# Patient Record
Sex: MALE | Race: WHITE | Employment: OTHER | URBAN - METROPOLITAN AREA
[De-identification: names, ages, dates, MRNs, and addresses within clinical notes are randomized per-mention and may not be internally consistent; named-entity substitution may affect disease eponyms.]

---

## 2018-09-26 ENCOUNTER — HOSPITAL ENCOUNTER (INPATIENT)
Facility: CLINIC | Age: 73
LOS: 2 days | Discharge: HOME | DRG: 871 | End: 2018-09-28
Attending: EMERGENCY MEDICINE | Admitting: INTERNAL MEDICINE
Payer: MEDICARE

## 2018-09-26 ENCOUNTER — TRANSFERRED RECORDS (OUTPATIENT)
Dept: HEALTH INFORMATION MANAGEMENT | Facility: CLINIC | Age: 73
End: 2018-09-26

## 2018-09-26 DIAGNOSIS — J18.9 COMMUNITY ACQUIRED PNEUMONIA OF LEFT LOWER LOBE OF LUNG: ICD-10-CM

## 2018-09-26 DIAGNOSIS — R65.20 SEVERE SEPSIS (H): ICD-10-CM

## 2018-09-26 DIAGNOSIS — A41.9 SEVERE SEPSIS (H): ICD-10-CM

## 2018-09-26 DIAGNOSIS — J01.00 ACUTE NON-RECURRENT MAXILLARY SINUSITIS: ICD-10-CM

## 2018-09-26 DIAGNOSIS — E11.9 DIABETES MELLITUS WITHOUT COMPLICATION (H): Primary | ICD-10-CM

## 2018-09-26 LAB
CO2 BLDCOV-SCNC: 26 MMOL/L (ref 21–28)
CO2 BLDCOV-SCNC: 27 MMOL/L (ref 21–28)
CREAT SERPL-MCNC: 1.1 MG/DL (ref 0.66–1.25)
CREAT SERPL-MCNC: 1.1 MG/DL (ref 0.73–1.18)
GFR SERPL CREATININE-BSD FRML MDRD: 66 ML/MIN/1.7M2
GFR SERPL CREATININE-BSD FRML MDRD: >60 ML/MIN/1.73M2
GLUCOSE BLDC GLUCOMTR-MCNC: 187 MG/DL (ref 70–99)
GLUCOSE BLDC GLUCOMTR-MCNC: 194 MG/DL (ref 70–99)
GLUCOSE SERPL-MCNC: 316 MG/DL (ref 70–100)
HBA1C MFR BLD: 7.5 % (ref 0–5.6)
INTERPRETATION ECG - MUSE: NORMAL
LACTATE BLD-SCNC: 1.7 MMOL/L (ref 0.7–2)
LACTATE BLD-SCNC: 3.1 MMOL/L (ref 0.7–2.1)
LACTATE BLD-SCNC: 3.5 MMOL/L (ref 0.7–2.1)
PCO2 BLDV: 39 MM HG (ref 40–50)
PCO2 BLDV: 45 MM HG (ref 40–50)
PH BLDV: 7.38 PH (ref 7.32–7.43)
PH BLDV: 7.43 PH (ref 7.32–7.43)
PLATELET # BLD AUTO: 206 10E9/L (ref 150–450)
PO2 BLDV: 30 MM HG (ref 25–47)
PO2 BLDV: 36 MM HG (ref 25–47)
POTASSIUM SERPL-SCNC: 4.2 MMOL/L (ref 3.4–5.3)
POTASSIUM SERPL-SCNC: 4.8 MMOL/L (ref 3.5–5.2)
SAO2 % BLDV FROM PO2: 56 %
SAO2 % BLDV FROM PO2: 70 %
TROPONIN I SERPL-MCNC: <0.015 UG/L (ref 0–0.04)

## 2018-09-26 PROCEDURE — 83036 HEMOGLOBIN GLYCOSYLATED A1C: CPT | Performed by: INTERNAL MEDICINE

## 2018-09-26 PROCEDURE — 25000125 ZZHC RX 250: Performed by: INTERNAL MEDICINE

## 2018-09-26 PROCEDURE — A9270 NON-COVERED ITEM OR SERVICE: HCPCS | Performed by: EMERGENCY MEDICINE

## 2018-09-26 PROCEDURE — 99285 EMERGENCY DEPT VISIT HI MDM: CPT | Mod: 25

## 2018-09-26 PROCEDURE — 93010 ELECTROCARDIOGRAM REPORT: CPT | Performed by: INTERNAL MEDICINE

## 2018-09-26 PROCEDURE — 36415 COLL VENOUS BLD VENIPUNCTURE: CPT | Performed by: INTERNAL MEDICINE

## 2018-09-26 PROCEDURE — 25000128 H RX IP 250 OP 636: Performed by: INTERNAL MEDICINE

## 2018-09-26 PROCEDURE — 84132 ASSAY OF SERUM POTASSIUM: CPT | Performed by: INTERNAL MEDICINE

## 2018-09-26 PROCEDURE — A9270 NON-COVERED ITEM OR SERVICE: HCPCS | Mod: GY | Performed by: INTERNAL MEDICINE

## 2018-09-26 PROCEDURE — 87040 BLOOD CULTURE FOR BACTERIA: CPT | Performed by: EMERGENCY MEDICINE

## 2018-09-26 PROCEDURE — 83605 ASSAY OF LACTIC ACID: CPT | Performed by: INTERNAL MEDICINE

## 2018-09-26 PROCEDURE — 96365 THER/PROPH/DIAG IV INF INIT: CPT

## 2018-09-26 PROCEDURE — 40000275 ZZH STATISTIC RCP TIME EA 10 MIN

## 2018-09-26 PROCEDURE — 00000146 ZZHCL STATISTIC GLUCOSE BY METER IP

## 2018-09-26 PROCEDURE — A9270 NON-COVERED ITEM OR SERVICE: HCPCS | Performed by: INTERNAL MEDICINE

## 2018-09-26 PROCEDURE — 94640 AIRWAY INHALATION TREATMENT: CPT

## 2018-09-26 PROCEDURE — 84484 ASSAY OF TROPONIN QUANT: CPT | Performed by: EMERGENCY MEDICINE

## 2018-09-26 PROCEDURE — 85049 AUTOMATED PLATELET COUNT: CPT | Performed by: INTERNAL MEDICINE

## 2018-09-26 PROCEDURE — 25000125 ZZHC RX 250: Performed by: EMERGENCY MEDICINE

## 2018-09-26 PROCEDURE — 12000000 ZZH R&B MED SURG/OB

## 2018-09-26 PROCEDURE — 93005 ELECTROCARDIOGRAM TRACING: CPT

## 2018-09-26 PROCEDURE — A9270 NON-COVERED ITEM OR SERVICE: HCPCS | Mod: GY | Performed by: EMERGENCY MEDICINE

## 2018-09-26 PROCEDURE — 82565 ASSAY OF CREATININE: CPT | Performed by: INTERNAL MEDICINE

## 2018-09-26 PROCEDURE — 96361 HYDRATE IV INFUSION ADD-ON: CPT

## 2018-09-26 PROCEDURE — 83605 ASSAY OF LACTIC ACID: CPT

## 2018-09-26 PROCEDURE — 99222 1ST HOSP IP/OBS MODERATE 55: CPT | Mod: AI | Performed by: INTERNAL MEDICINE

## 2018-09-26 PROCEDURE — 25000132 ZZH RX MED GY IP 250 OP 250 PS 637: Mod: GY | Performed by: EMERGENCY MEDICINE

## 2018-09-26 PROCEDURE — 25000131 ZZH RX MED GY IP 250 OP 636 PS 637: Mod: GY | Performed by: INTERNAL MEDICINE

## 2018-09-26 PROCEDURE — 25000128 H RX IP 250 OP 636: Performed by: EMERGENCY MEDICINE

## 2018-09-26 PROCEDURE — 25000132 ZZH RX MED GY IP 250 OP 250 PS 637: Mod: GY | Performed by: INTERNAL MEDICINE

## 2018-09-26 PROCEDURE — 82803 BLOOD GASES ANY COMBINATION: CPT

## 2018-09-26 RX ORDER — ASPIRIN 81 MG/1
81 TABLET ORAL DAILY
Status: DISCONTINUED | OUTPATIENT
Start: 2018-09-26 | End: 2018-09-28 | Stop reason: HOSPADM

## 2018-09-26 RX ORDER — POTASSIUM CL/LIDO/0.9 % NACL 10MEQ/0.1L
10 INTRAVENOUS SOLUTION, PIGGYBACK (ML) INTRAVENOUS
Status: DISCONTINUED | OUTPATIENT
Start: 2018-09-26 | End: 2018-09-28 | Stop reason: HOSPADM

## 2018-09-26 RX ORDER — POTASSIUM CHLORIDE 1.5 G/1.58G
20-40 POWDER, FOR SOLUTION ORAL
Status: DISCONTINUED | OUTPATIENT
Start: 2018-09-26 | End: 2018-09-28 | Stop reason: HOSPADM

## 2018-09-26 RX ORDER — NICOTINE POLACRILEX 4 MG/1
20 GUM, CHEWING ORAL AT BEDTIME
Status: ON HOLD | COMMUNITY
End: 2021-04-29

## 2018-09-26 RX ORDER — CEFTRIAXONE 1 G/1
1 INJECTION, POWDER, FOR SOLUTION INTRAMUSCULAR; INTRAVENOUS ONCE
Status: COMPLETED | OUTPATIENT
Start: 2018-09-26 | End: 2018-09-26

## 2018-09-26 RX ORDER — CEFTRIAXONE 1 G/1
1 INJECTION, POWDER, FOR SOLUTION INTRAMUSCULAR; INTRAVENOUS EVERY 24 HOURS
Status: DISCONTINUED | OUTPATIENT
Start: 2018-09-27 | End: 2018-09-28 | Stop reason: HOSPADM

## 2018-09-26 RX ORDER — LEVOTHYROXINE SODIUM 150 UG/1
150 TABLET ORAL DAILY
Status: DISCONTINUED | OUTPATIENT
Start: 2018-09-26 | End: 2018-09-28 | Stop reason: HOSPADM

## 2018-09-26 RX ORDER — POTASSIUM CHLORIDE 1500 MG/1
20-40 TABLET, EXTENDED RELEASE ORAL
Status: DISCONTINUED | OUTPATIENT
Start: 2018-09-26 | End: 2018-09-28 | Stop reason: HOSPADM

## 2018-09-26 RX ORDER — OXYMETAZOLINE HYDROCHLORIDE 0.05 G/100ML
2 SPRAY NASAL 2 TIMES DAILY
Status: COMPLETED | OUTPATIENT
Start: 2018-09-26 | End: 2018-09-28

## 2018-09-26 RX ORDER — LISINOPRIL 10 MG/1
10 TABLET ORAL AT BEDTIME
Status: ON HOLD | COMMUNITY
End: 2021-05-02

## 2018-09-26 RX ORDER — LEVOTHYROXINE SODIUM 150 UG/1
150 TABLET ORAL DAILY
COMMUNITY

## 2018-09-26 RX ORDER — AZITHROMYCIN 250 MG/1
250 TABLET, FILM COATED ORAL DAILY
Status: DISCONTINUED | OUTPATIENT
Start: 2018-09-27 | End: 2018-09-27

## 2018-09-26 RX ORDER — POTASSIUM CHLORIDE 7.45 MG/ML
10 INJECTION INTRAVENOUS
Status: DISCONTINUED | OUTPATIENT
Start: 2018-09-26 | End: 2018-09-28 | Stop reason: HOSPADM

## 2018-09-26 RX ORDER — GLIPIZIDE 2.5 MG/1
2.5 TABLET, EXTENDED RELEASE ORAL DAILY
Status: ON HOLD | COMMUNITY
End: 2018-09-28

## 2018-09-26 RX ORDER — NICOTINE POLACRILEX 4 MG
15-30 LOZENGE BUCCAL
Status: DISCONTINUED | OUTPATIENT
Start: 2018-09-26 | End: 2018-09-28 | Stop reason: HOSPADM

## 2018-09-26 RX ORDER — GLIPIZIDE 2.5 MG/1
2.5 TABLET, EXTENDED RELEASE ORAL DAILY
Status: DISCONTINUED | OUTPATIENT
Start: 2018-09-27 | End: 2018-09-28 | Stop reason: HOSPADM

## 2018-09-26 RX ORDER — ONDANSETRON 4 MG/1
4 TABLET, ORALLY DISINTEGRATING ORAL EVERY 6 HOURS PRN
Status: DISCONTINUED | OUTPATIENT
Start: 2018-09-26 | End: 2018-09-28 | Stop reason: HOSPADM

## 2018-09-26 RX ORDER — DEXTROSE MONOHYDRATE 25 G/50ML
25-50 INJECTION, SOLUTION INTRAVENOUS
Status: DISCONTINUED | OUTPATIENT
Start: 2018-09-26 | End: 2018-09-28 | Stop reason: HOSPADM

## 2018-09-26 RX ORDER — ACETAMINOPHEN 325 MG/1
650 TABLET ORAL EVERY 4 HOURS PRN
Status: DISCONTINUED | OUTPATIENT
Start: 2018-09-26 | End: 2018-09-28 | Stop reason: HOSPADM

## 2018-09-26 RX ORDER — AZITHROMYCIN 250 MG/1
500 TABLET, FILM COATED ORAL ONCE
Status: COMPLETED | OUTPATIENT
Start: 2018-09-26 | End: 2018-09-26

## 2018-09-26 RX ORDER — POTASSIUM CHLORIDE 29.8 MG/ML
20 INJECTION INTRAVENOUS
Status: DISCONTINUED | OUTPATIENT
Start: 2018-09-26 | End: 2018-09-28 | Stop reason: HOSPADM

## 2018-09-26 RX ORDER — LISINOPRIL 10 MG/1
10 TABLET ORAL AT BEDTIME
Status: DISCONTINUED | OUTPATIENT
Start: 2018-09-26 | End: 2018-09-28 | Stop reason: HOSPADM

## 2018-09-26 RX ORDER — ONDANSETRON 2 MG/ML
4 INJECTION INTRAMUSCULAR; INTRAVENOUS EVERY 6 HOURS PRN
Status: DISCONTINUED | OUTPATIENT
Start: 2018-09-26 | End: 2018-09-28 | Stop reason: HOSPADM

## 2018-09-26 RX ORDER — OXYMETAZOLINE HYDROCHLORIDE 0.05 G/100ML
2 SPRAY NASAL ONCE
Status: COMPLETED | OUTPATIENT
Start: 2018-09-26 | End: 2018-09-26

## 2018-09-26 RX ORDER — ALBUTEROL SULFATE 0.83 MG/ML
2.5 SOLUTION RESPIRATORY (INHALATION) ONCE
Status: COMPLETED | OUTPATIENT
Start: 2018-09-26 | End: 2018-09-26

## 2018-09-26 RX ORDER — NALOXONE HYDROCHLORIDE 0.4 MG/ML
.1-.4 INJECTION, SOLUTION INTRAMUSCULAR; INTRAVENOUS; SUBCUTANEOUS
Status: DISCONTINUED | OUTPATIENT
Start: 2018-09-26 | End: 2018-09-28 | Stop reason: HOSPADM

## 2018-09-26 RX ADMIN — ENOXAPARIN SODIUM 40 MG: 40 INJECTION SUBCUTANEOUS at 16:19

## 2018-09-26 RX ADMIN — LEVOTHYROXINE SODIUM 150 MCG: 150 TABLET ORAL at 16:19

## 2018-09-26 RX ADMIN — OXYMETAZOLINE HYDROCHLORIDE 2 SPRAY: 5 SPRAY NASAL at 12:33

## 2018-09-26 RX ADMIN — SODIUM CHLORIDE 250 ML: 9 INJECTION, SOLUTION INTRAVENOUS at 13:45

## 2018-09-26 RX ADMIN — CEFTRIAXONE SODIUM 1 G: 1 INJECTION, POWDER, FOR SOLUTION INTRAMUSCULAR; INTRAVENOUS at 12:32

## 2018-09-26 RX ADMIN — OXYMETAZOLINE HYDROCHLORIDE 2 SPRAY: 0.05 SPRAY NASAL at 22:56

## 2018-09-26 RX ADMIN — SODIUM CHLORIDE, POTASSIUM CHLORIDE, SODIUM LACTATE AND CALCIUM CHLORIDE 1000 ML: 600; 310; 30; 20 INJECTION, SOLUTION INTRAVENOUS at 12:16

## 2018-09-26 RX ADMIN — SODIUM CHLORIDE 500 ML: 9 INJECTION, SOLUTION INTRAVENOUS at 12:48

## 2018-09-26 RX ADMIN — AZITHROMYCIN MONOHYDRATE 500 MG: 250 TABLET ORAL at 12:31

## 2018-09-26 RX ADMIN — INSULIN ASPART 1 UNITS: 100 INJECTION, SOLUTION INTRAVENOUS; SUBCUTANEOUS at 18:18

## 2018-09-26 RX ADMIN — SODIUM CHLORIDE 1000 ML: 9 INJECTION, SOLUTION INTRAVENOUS at 16:07

## 2018-09-26 RX ADMIN — OMEPRAZOLE 20 MG: 20 CAPSULE, DELAYED RELEASE ORAL at 22:56

## 2018-09-26 RX ADMIN — ALBUTEROL SULFATE 2.5 MG: 2.5 SOLUTION RESPIRATORY (INHALATION) at 12:17

## 2018-09-26 RX ADMIN — ASPIRIN 81 MG: 81 TABLET, COATED ORAL at 16:19

## 2018-09-26 RX ADMIN — LISINOPRIL 10 MG: 10 TABLET ORAL at 22:56

## 2018-09-26 ASSESSMENT — ENCOUNTER SYMPTOMS
SHORTNESS OF BREATH: 0
NAUSEA: 0
COUGH: 1
HEADACHES: 0
FEVER: 1
CHEST TIGHTNESS: 1
DIARRHEA: 0
SINUS PAIN: 1
VOMITING: 0
NUMBNESS: 0

## 2018-09-26 ASSESSMENT — ACTIVITIES OF DAILY LIVING (ADL)
ADLS_ACUITY_SCORE: 11
ADLS_ACUITY_SCORE: 9

## 2018-09-26 NOTE — PHARMACY-ADMISSION MEDICATION HISTORY
Admission medication history interview status for this patient is complete. See [facility] admission navigator for allergy information, prior to admission medications and immunization status.     Medication history interview source(s):Patient  Medication history resources (including written lists, pill bottles, clinic record):Care Everywhere  Primary pharmacy:[facility]    Changes made to PTA medication list:  Added: all medications  Deleted: none  Changed: none    Actions taken by pharmacist (provider contacted, etc):None     Additional medication history information:None    Medication reconciliation/reorder completed by provider prior to medication history? No    Do you take OTC medications (eg tylenol, ibuprofen, fish oil, eye/ear drops, etc)? Y(Y/N)    For patients on insulin therapy: N (Y/N)  Lantus/levemir/NPH/Mix 70/30 dose:   (Y/N) (see Med list for doses)   Sliding scale Novolog Y/N  If Yes, do you have a baseline novolog pre-meal dose:  units with meals  Patients eat three meals a day:   Y/N    How many episodes of hypoglycemia do you have per week: _______  How many missed doses do you have per week: ______  How many times do you check your blood glucose per day: _______   Any Barriers to therapy - Be specific :  cost of medications, comfortable with giving injections (if applicable), comfortable and confident with current diabetes regimen: Y/N ______________      Prior to Admission medications    Medication Sig Last Dose Taking? Auth Provider   aspirin 325 MG EC tablet Take 650 mg by mouth daily as needed for moderate pain  Yes Unknown, Entered By History   glipiZIDE (GLUCOTROL XL) 2.5 MG 24 hr tablet Take 2.5 mg by mouth daily 9/26/2018 at Unknown time Yes Unknown, Entered By History   levothyroxine (SYNTHROID/LEVOTHROID) 150 MCG tablet Take 150 mcg by mouth daily 9/25/2018 at Unknown time Yes Unknown, Entered By History   lisinopril (PRINIVIL/ZESTRIL) 10 MG tablet Take 10 mg by mouth At Bedtime  9/25/2018 at Unknown time Yes Unknown, Entered By History   metFORMIN (GLUCOPHAGE) 500 MG tablet Take 500 mg by mouth daily (with breakfast) 9/26/2018 at Unknown time Yes Unknown, Entered By History   metFORMIN (GLUCOPHAGE) 500 MG tablet Take 1,000 mg by mouth daily (with dinner) 9/25/2018 at Unknown time Yes Unknown, Entered By History   omeprazole 20 MG tablet Take 20 mg by mouth At Bedtime 9/25/2018 at Unknown time Yes Unknown, Entered By History

## 2018-09-26 NOTE — IP AVS SNAPSHOT
MRN:[id number]                      After Visit Summary   9/26/2018    Luis Vidal    MRN: [id number]           Thank you!     Thank you for choosing [facility] for your care. Our goal is always to provide you with excellent care. Hearing back from our patients is one way we can continue to improve our services. Please take a few minutes to complete the written survey that you may receive in the mail after you visit. If you would like to speak to someone directly about your visit please contact Patient Relations at [phone]. Thank you!          Patient Information     Date Of Birth          1945        Designated Caregiver       Most Recent Value    Caregiver    Will someone help with your care after discharge? yes    Name of designated caregiver Lexie    Phone number of caregiver se chart    Caregiver address see chart       About your hospital stay     You were admitted on:  September 26, 2018 You last received care in the:  [place] [address] [facility]    You were discharged on:  September 28, 2018        Reason for your hospital stay       Community acquired pneumonia, sinusitis.                  Who to Call     For medical emergencies, please call 911.  For non-urgent questions about your medical care, please call your primary care provider or clinic, [phone]          Attending Provider     Provider Specialty    Anibal Ragland MD Emergency Medicine    [facility], Franco Pastor III, MD Internal Medicine       Primary Care Provider Office Phone # Fax #    Shailesh Lipscomb [phone]      After Care Instructions     Monitor and record       Blood glucose as previous.                  Follow-up Appointments     Follow-up and recommended labs and tests        Dr Turcios in 5 days.  CXR in 6 weeks.                  Pending Results     Date and Time Order Name Status Description    9/26/2018 1208 Blood culture ONE site Preliminary   "           Statement of Approval     Ordered          18 1034  I have reviewed and agree with all the recommendations and orders detailed in this document.  EFFECTIVE NOW     Approved and electronically signed by:  Luis A Coats MD             Admission Information     Date & Time Provider Department Dept. Phone    2018 Franco Valle III, MD [place] [address] [facility] [phone]      Your Vitals Were     Blood Pressure Pulse Temperature Respirations Weight Pulse Oximetry    151/82 (BP Location: Left arm) 79 97.2  F (36.2  C) (Oral) 16 80.7 kg (178 lb) 92%      MyChart Information     [employer] lets you send messages to your doctor, view your test results, renew your prescriptions, schedule appointments and more. To sign up, go to [email or web address] . Click on \"Log in\" on the left side of the screen, which will take you to the Welcome page. Then click on \"Sign up Now\" on the right side of the page.     You will be asked to enter the access code listed below, as well as some personal information. Please follow the directions to create your username and password.     Your access code is: [email or web address]  Expires: 2018  2:24 PM     Your access code will  in 90 days. If you need help or a new code, please call your [place] clinic or [phone].        Care EveryWhere ID     This is your Care EveryWhere ID. This could be used by other organizations to access your [place] medical records  [id number]        Equal Access to Services     [facility] Center[facility] : Hadii aad ku hadasho Soomaali, waaxda luqadaha, qaybta kaalmada adeegyada, waxay angel hayhernann ting meade . So [facility] [phone].    ATENCIÓN: Si habla español, tiene a dunn disposición servicios gratuitos de asistencia lingüística. Llame al [phone].    We comply with applicable federal civil rights laws and [place] laws. We do not discriminate on the basis of race, color, national origin, age, " disability, sex, sexual orientation, or gender identity.               Review of your medicines      START taking        Dose / Directions    amoxicillin-clavulanate 875-125 MG per tablet   Commonly known as:  AUGMENTIN   Used for:  Severe sepsis (H), Community acquired pneumonia of left lower lobe of lung (H)        Dose:  1 tablet   Take 1 tablet by mouth 2 times daily for 8 days   Quantity:  16 tablet   Refills:  0         CONTINUE these medicines which may have CHANGED, or have new prescriptions. If we are uncertain of the size of tablets/capsules you have at home, strength may be listed as something that might have changed.        Dose / Directions    glipiZIDE 5 MG 24 hr tablet   Commonly known as:  GLUCOTROL XL   This may have changed:    - medication strength  - how much to take   Used for:  Diabetes mellitus without complication (H)        Dose:  5 mg   Take 1 tablet (5 mg) by mouth daily   Quantity:  30 tablet   Refills:  0         CONTINUE these medicines which have NOT CHANGED        Dose / Directions    aspirin 325 MG EC tablet        Dose:  650 mg   Take 650 mg by mouth daily as needed for moderate pain   Refills:  0       levothyroxine 150 MCG tablet   Commonly known as:  SYNTHROID/LEVOTHROID        Dose:  150 mcg   Take 150 mcg by mouth daily   Refills:  0       lisinopril 10 MG tablet   Commonly known as:  PRINIVIL/ZESTRIL        Dose:  10 mg   Take 10 mg by mouth At Bedtime   Refills:  0       omeprazole 20 MG tablet        Dose:  20 mg   Take 20 mg by mouth At Bedtime   Refills:  0         STOP taking     metFORMIN 500 MG tablet   Commonly known as:  GLUCOPHAGE                Where to get your medicines      These medications were sent to [place] [address] [facility] [place] - [address]  [address] [address]     Phone:  [phone]     amoxicillin-clavulanate 875-125 MG per tablet    glipiZIDE 5 MG 24 hr tablet                Protect others around you: Learn how to safely  use, store and throw away your medicines at www.disposemymeds.org.        ANTIBIOTIC INSTRUCTION     You've Been Prescribed an Antibiotic - Now What?  Your healthcare team thinks that you or your loved one might have an infection. Some infections can be treated with antibiotics, which are powerful, life-saving drugs. Like all medications, antibiotics have side effects and should only be used when necessary. There are some important things you should know about your antibiotic treatment.      Your healthcare team may run tests before you start taking an antibiotic.    Your team may take samples (e.g., from your blood, urine or other areas) to run tests to look for bacteria. These test can be important to determine if you need an antibiotic at all and, if you do, which antibiotic will work best.      Within a few days, your healthcare team might change or even stop your antibiotic.    Your team may start you on an antibiotic while they are working to find out what is making you sick.    Your team might change your antibiotic because test results show that a different antibiotic would be better to treat your infection.    In some cases, once your team has more information, they learn that you do not need an antibiotic at all. They may find out that you don't have an infection, or that the antibiotic you're taking won't work against your infection. For example, an infection caused by a virus can't be treated with antibiotics. Staying on an antibiotic when you don't need it is more likely to be harmful than helpful.      You may experience side effects from your antibiotic.    Like all medications, antibiotics have side effects. Some of these can be serious.    Let you healthcare team know if you have any known allergies when you are admitted to the hospital.    One significant side effect of nearly all antibiotics is the risk of severe and sometimes deadly diarrhea caused by Clostridium difficile (C. Difficile). This  occurs when a person takes antibiotics because some good germs are destroyed. Antibiotic use allows C. diificile to take over, putting patients at high risk for this serious infection.    As a patient or caregiver, it is important to understand your or your loved one's antibiotic treatment. It is especially important for caregivers to speak up when patients can't speak for themselves. Here are some important questions to ask your healthcare team.    What infection is this antibiotic treating and how do you know I have that infection?    What side effects might occur from this antibiotic?    How long will I need to take this antibiotic?    Is it safe to take this antibiotic with other medications or supplements (e.g., vitamins) that I am taking?     Are there any special directions I need to know about taking this antibiotic? For example, should I take it with food?    How will I be monitored to know whether my infection is responding to the antibiotic?    What tests may help to make sure the right antibiotic is prescribed for me?      Information provided by:  www.cdc.gov/getsmart  U.S. Department of Health and Human Services  Centers for disease Control and Prevention  National Center for Emerging and Zoonotic Infectious Diseases  Division of Healthcare Quality Promotion             Medication List: This is a list of all your medications and when to take them. Check marks below indicate your daily home schedule. Keep this list as a reference.      Medications           Morning Afternoon Evening Bedtime As Needed    amoxicillin-clavulanate 875-125 MG per tablet   Commonly known as:  AUGMENTIN   Take 1 tablet by mouth 2 times daily for 8 days   Next Dose Due:  9/28:  evening                                      aspirin 325 MG EC tablet   Take 650 mg by mouth daily as needed for moderate pain   Last time this was given:  81 mg on 9/28/2018  8:03 AM   Next Dose Due:  9/29:AM                                   glipiZIDE  5 MG 24 hr tablet   Commonly known as:  GLUCOTROL XL   Take 1 tablet (5 mg) by mouth daily   Last time this was given:  2.5 mg on 9/28/2018  8:03 AM   Next Dose Due:  9/29:AM                                   levothyroxine 150 MCG tablet   Commonly known as:  SYNTHROID/LEVOTHROID   Take 150 mcg by mouth daily   Last time this was given:  150 mcg on 9/28/2018  8:03 AM   Next Dose Due:  9/29:AM                                   lisinopril 10 MG tablet   Commonly known as:  PRINIVIL/ZESTRIL   Take 10 mg by mouth At Bedtime   Last time this was given:  10 mg on 9/27/2018  9:40 PM   Next Dose Due:  9/28: bedtime                                   omeprazole 20 MG tablet   Take 20 mg by mouth At Bedtime   Next Dose Due:  9/28:bedtime

## 2018-09-26 NOTE — IP AVS SNAPSHOT
[place] [address] [facility]    [address]    [facility] [address]    Phone:  [phone]    Fax:  [phone]                                       After Visit Summary   9/26/2018    Luis Vidal    MRN: [id number]           After Visit Summary Signature Page     I have received my discharge instructions, and my questions have been answered. I have discussed any challenges I see with this plan with the nurse or doctor.    ..........................................................................................................................................  Patient/Patient Representative Signature      ..........................................................................................................................................  Patient Representative Print Name and Relationship to Patient    ..................................................               ................................................  Date                                   Time    ..........................................................................................................................................  Reviewed by Signature/Title    ...................................................              ..............................................  Date                                               Time          22EPIC Rev 08/18

## 2018-09-26 NOTE — ED TRIAGE NOTES
Cough and fevers for a few days.  Seen at clinic today.  Elevated WBC count and diagnosed with pneumonia.  Attempted to direct admit but unable.      ABCs intact.  Alert and oriented x 3.

## 2018-09-26 NOTE — H&P
[facility]    History and Physical  Hospitalist       Date of Admission:  9/26/2018    Assessment & Plan   Luis Vidal is a 72 year old male with a past medical history of hypertension, dyslipidemia, type 2 diabetes on orals, GERD with Puente's esophagus who presents from urgent care with pneumonia and sinusitis.    Bacterial pneumonia  -No hypoxia  -Leukocytosis of 22 and elevated lactate qualify for sepsis  -Additional 1 L bolus of IV fluids now  -Blood culture sent from the ED  -To new ceftriaxone and azithromycin  -Repeat an EKG in the morning to ensure QTC is not further prolonged, if so he likely will need to have his azithromycin replaced with doxycycline    Maxillary sinusitis  -This should be covered with antibiotics for above    Essential hypertension  -Continue home lisinopril    Type 2 diabetes  -Hold metformin given elevated lactate  -Continue oral sulfonylurea  -Correctional insulin, low  -Carb controlled diet    # Pain Assessment:  Current Pain Score 9/26/2018   Pain score (0-10) 0   Luis snider pain level was assessed and he currently denies pain.       DVT Prophylaxis: Enoxaparin (Lovenox) SQ  Code Status: Full Code    Disposition: Expected discharge in 2-3 days    Franco Valle III, MD    Primary Care Physician   Shailesh Lipscomb    Chief Complaint     History is obtained from the patient, ED physician and review of records.    History of Present Illness   Luis Vidal is a 72 year old male with a past medical history of hypertension, dyslipidemia, type 2 diabetes on orals, GERD with Puente's esophagus who presents from urgent care with apparent pneumonia and sinusitis.    Patient reports about 2 weeks of what he thought was a viral upper respiratory infection.  Some stuffiness and a cough.  He felt that he was improving until today when he began to have worsening shortness of breath and severe sinus pain particularly on the right.    He had a fever of 102 this  morning and presented to urgent care today where he was found to have a leukocytosis of 22k, a chest x-ray with a left-sided infiltrate, a sinus x-ray which showed some maxillary sinusitis and so he was directed to the emergency department for evaluation.  Next    In the ED he is afebrile and normotensive but he did have a lactate elevation of about 3.5 which did not improve with a 500 cc bolus.  He was started on ceftriaxone and azithromycin and admitted to medicine.    Of note his QTC is a little bit long at about 440.    Past Medical History    HTN, Type 2 DM, HLD, GERD, Puente's Esophagus    Past Surgical History   Denies    Prior to Admission Medications   Prior to Admission Medications   Prescriptions Last Dose Informant Patient Reported? Taking?   aspirin 325 MG EC tablet   Yes Yes   Sig: Take 650 mg by mouth daily as needed for moderate pain   glipiZIDE (GLUCOTROL XL) 2.5 MG 24 hr tablet 9/26/2018 at Unknown time  Yes Yes   Sig: Take 2.5 mg by mouth daily   levothyroxine (SYNTHROID/LEVOTHROID) 150 MCG tablet 9/25/2018 at Unknown time  Yes Yes   Sig: Take 150 mcg by mouth daily   lisinopril (PRINIVIL/ZESTRIL) 10 MG tablet 9/25/2018 at Unknown time  Yes Yes   Sig: Take 10 mg by mouth At Bedtime   metFORMIN (GLUCOPHAGE) 500 MG tablet 9/26/2018 at Unknown time  Yes Yes   Sig: Take 500 mg by mouth daily (with breakfast)   metFORMIN (GLUCOPHAGE) 500 MG tablet 9/25/2018 at Unknown time  Yes Yes   Sig: Take 1,000 mg by mouth daily (with dinner)   omeprazole 20 MG tablet 9/25/2018 at Unknown time  Yes Yes   Sig: Take 20 mg by mouth At Bedtime      Facility-Administered Medications: None     Allergies   No Known Allergies    Social History   Non-smoker, lives at home with wife      Family History   Family history reviewed with patient and is noncontributory.    Review of Systems   The 10 point Review of Systems is negative other than noted in the HPI or here.     Physical Exam   Temp: 98.6  F (37  C) Temp src:  Temporal BP: 125/78 Pulse: 96 Heart Rate: 98 Resp: 22 SpO2: 96 % O2 Device: None (Room air)    Vital Signs with Ranges  Temp:  [98.6  F (37  C)] 98.6  F (37  C)  Pulse:  [96] 96  Heart Rate:  [90-98] 98  Resp:  [8-22] 22  BP: (108-135)/(70-79) 125/78  SpO2:  [96 %-98 %] 96 %  178 lbs 0 oz    Constitutional: NAD. Comfortable, well-developed   Eyes: Anicteric, no conjunctival injection  ENT: Atraumatic, membranes moist   Neck: Supple, trachea midline  Respiratory: No wheeze or crackles. Breathing comfortably on room air  Cardiovascular: S1S2, no edema  GI: Abdomen soft, non-tender  Skin: Warm, pink, dry  Neurologic: Alert and oriented. CN II - XII grossly intact  Psychiatric: Pleasant, cooperative    Data   Data reviewed today:  I personally reviewed no images or EKG's today.    Recent Labs  Lab 09/26/18  1216   TROPI <0.015       Imaging:  No results found for this or any previous visit (from the past 24 hour(s)).

## 2018-09-26 NOTE — ED NOTES
Patient resting quietly in bed.  Given boxed lunch.  Meal tray ordered.  Family at bedside.  Continuing to monitor.

## 2018-09-26 NOTE — ED NOTES
[facility]  ED Nurse Handoff Report    Luis Vidla is a 72 year old male   ED Chief complaint: Cough; Fever; and Shortness of Breath  . ED Diagnosis:   Final diagnoses:   Community acquired pneumonia of left lower lobe of lung (H)   Severe sepsis (H)   Acute non-recurrent maxillary sinusitis     Allergies: No Known Allergies    Code Status: Full Code  Activity level - Baseline/Home:  Independent. Activity Level - Current:   Independent. Lift room needed: No. Bariatric: No   Needed: No   Isolation: Yes. Infection: Not Applicable.     Vital Signs:   Vitals:    09/26/18 1330 09/26/18 1345 09/26/18 1400 09/26/18 1415   BP: 113/75 116/70 135/79 125/78   Pulse:       Resp:    22   Temp:       TempSrc:       SpO2:       Weight:           Cardiac Rhythm:  ,      Pain level: 0-10 Pain Scale: 0  Patient confused: No. Patient Falls Risk: No.   Elimination Status: Has voided   Patient Report - Initial Complaint: Sent from clinic for admission and treatment of pneumonia. Focused Assessment: crackles to LL lobe, fevers at home and at clinic  Tests Performed: labs. Abnormal Results:   Labs Ordered and Resulted from Time of ED Arrival Up to the Time of Departure from the ED   ISTAT  GASES LACTATE RANDALL POCT - Abnormal; Notable for the following:        Result Value    PCO2 Venous 39 (*)     Lactic Acid 3.1 (*)     All other components within normal limits   ISTAT  GASES LACTATE RANDALL POCT - Abnormal; Notable for the following:     Lactic Acid 3.5 (*)     All other components within normal limits   TROPONIN I   ISTAT CG4 GASES LACTATE RANDALL NURSING POCT   BLOOD CULTURE   .   Treatments provided: antibiotics, fluids  Family Comments: wife at bedside, patient ambulatory without assist.  OBS brochure/video discussed/provided to patient:  N/A  ED Medications:   Medications   0.9% sodium chloride BOLUS (not administered)   0.9% sodium chloride BOLUS (0 mLs Intravenous Stopped 9/26/18 1322)   albuterol neb  solution 2.5 mg (2.5 mg Nebulization Given 9/26/18 1217)   oxymetazoline (AFRIN) 0.05 % spray 2 spray (2 sprays Nasal Given 9/26/18 1233)   cefTRIAXone (ROCEPHIN) 1 g vial to attach to  mL bag for ADULTS or NS 50 mL bag for PEDS (0 g Intravenous Stopped 9/26/18 1322)   lactated ringers BOLUS 1,000 mL (0 mLs Intravenous Stopped 9/26/18 1246)   azithromycin (ZITHROMAX) tablet 500 mg (500 mg Oral Given 9/26/18 1231)     Drips infusing:  No  For the majority of the shift, the patient's behavior Green. Interventions performed were none.     Severe Sepsis OR Septic Shock Diagnosis Present: No      ED Nurse Name/Phone Number: Letitia BANG Jazmine,   2:34 PM    RECEIVING UNIT ED HANDOFF REVIEW    Above ED Nurse Handoff Report was reviewed: Yes  Reviewed by: Amador Goodman on September 26, 2018 at 2:54 PM

## 2018-09-26 NOTE — ED PROVIDER NOTES
History     Chief Complaint:  Cough & Fever      HPI   Luis Vidal is a 72 year old male who presents to the ED via EMS for evaluation of a cough and fever. The patient comes here from urgent care, where he obtained a chest x-ray today showing signs of left-sided pneumonia, sinus x-ray showing signs of maxillary sinusitis, and labs showing an elevated WBC of 22.8. There the patient also experienced some hemoptysis, and they tried to admit him to the hospital without success. He was then given IM antibiotics and sent to the ED for evaluation. Here, the patient further reports that he has had a dry cough for the past several weeks. He also developed a fever last night into this morning, which he measured to be 102. This was associated with 2 episodes of left-sided chest pain and right-sided maxillary sinus throbbing. He denies any nausea, vomiting, diarrhea, visual disturbance, paresthesias, numbness, headache, ear pain, or shortness of breath. He denies any recent travel or current antibiotic use, and he does not use a nebulizer at home.    Imaging and blood tests obtained at [facility] on 9/26:  XR Chest, 2 views:  Linear opacities at the left lung base are favored due to mild subsegmental atelectasis or fibrosis. Lung consolidation/infiltrate felt less likely.    XR Sinuses, 1-2 views:  Findings are suspicious for acute right maxillary sinusitis, as per radiology.    CBC: WBC: 22.8 (H), HGB: 13.8, PLT: 266   BMP: Glucose 316 (H), CO2 21 (L),  (L), o/w WNL (Creatinine: 1.10)    Allergies:  Meperidine    Medications:    Insulin  Lisinopril  Aspirin  Synthroid  Prilosec  Metformin  Glucotrol  Zestril    Past Medical History:    Hypothyroidism  Obesity  Hypertension  Elevated PSA  Puente's esophagus with dysplasia  Hyperlipidemia  Diabetes, type II    Past Surgical History:    Prostate surgery    Family History:    Macular degeneration  Cataracts  Diabetes    Social History:  Negative  for tobacco use.  Alcohol use: yes  Marital Status:   [2]     Review of Systems   Constitutional: Positive for fever.   HENT: Positive for sinus pain. Negative for ear pain.    Eyes: Negative for visual disturbance.   Respiratory: Positive for cough and chest tightness. Negative for shortness of breath.    Cardiovascular: Positive for chest pain.   Gastrointestinal: Negative for diarrhea, nausea and vomiting.   Neurological: Negative for numbness and headaches.   All other systems reviewed and are negative.      Physical Exam     Patient Vitals for the past 24 hrs:   BP Temp Temp src Pulse Heart Rate Resp SpO2 Weight   09/26/18 1315 113/70 - - - 95 8 96 % -   09/26/18 1300 115/75 - - - 93 16 97 % -   09/26/18 1245 117/72 - - - 96 8 - -   09/26/18 1230 108/70 - - - 92 17 - -   09/26/18 1217 - - - - - - 98 % -   09/26/18 1215 112/72 - - - 90 21 - -   09/26/18 1200 117/74 - - - 92 22 - -   09/26/18 1157 134/76 98.6  F (37  C) Temporal 96 - 16 96 % 80.7 kg (178 lb)        Physical Exam   HENT:   Right Ear: External ear normal.   Left Ear: External ear normal.   Nose: Nose normal.   No oropharyngeal hypertrophy or exudates.  There is tenderness palpation over the right maxillary sinus more so than left maxillary sinus.  No pain over the frontal sinuses   Eyes: Conjunctivae and lids are normal.   Neck: Neck supple. No tracheal deviation present.   Cardiovascular: Regular rhythm and intact distal pulses.    Pulmonary/Chest: He has no rales.   Mild tachypnea and slightly prolonged expiratory phase   Abdominal: Soft. He exhibits no distension. There is no tenderness. There is no rebound and no guarding.   Musculoskeletal:   No peripheral edema   Neurological: He is alert.   MAEE, no gross focal motor or sensory deficit   Skin: Skin is warm and dry. He is not diaphoretic.   Psychiatric: He has a normal mood and affect.   Nursing note and vitals reviewed.      Emergency Department Course   ECG:  Indication: Chest  pain  Time: 1159  Vent. Rate 91 bpm. HI interval 204. QRS duration 126. QT/QTc 370/455. P-R-T axis 20 -47 0.  Normal sinus rhythm. Right bundle branch block.Left anterior fascicular block. Bifascicular block. Minimal voltage criteria for LVH, may be normal variant. Abnormal ECG. No significant change compared to EKG dated 12/2/15. Read time: 1200     Laboratory:  ISTAT gases lactate edison POCT: PCO2 39 (L), Lactic acid 3.1 (H), o/w WNL  Repeat ISTAT gases lactate edison POCT: Lactic acid 3.5 (H), o/w WNL    1216 Troponin: <0.015     Blood culture: pending    Interventions:  Medications   0.9% sodium chloride BOLUS (0 mLs Intravenous Stopped 9/26/18 1322)   albuterol neb solution 2.5 mg (2.5 mg Nebulization Given 9/26/18 1217)   oxymetazoline (AFRIN) 0.05 % spray 2 spray (2 sprays Nasal Given 9/26/18 1233)   cefTRIAXone (ROCEPHIN) 1 g vial to attach to  mL bag for ADULTS or NS 50 mL bag for PEDS (0 g Intravenous Stopped 9/26/18 1322)   lactated ringers BOLUS 1,000 mL (0 mLs Intravenous Stopped 9/26/18 1246)   azithromycin (ZITHROMAX) tablet 500 mg (500 mg Oral Given 9/26/18 1231)      1216 Lactated ringers 1L IV  1217 Albuterol 2.5 mg Neb  1231 Zithromax 500 mg PO  1232 Rocephin 1 g IV  1233 Afrin 2 sprays Nasal  1248  mL IV  Please see MAR for full list of medications administered in the ED.     Emergency Department Course:  Nursing notes and vitals reviewed. (1203) I performed an exam of the patient as documented above.     EKG obtained in the ED, see results above.      IV inserted. Medicine administered as documented above. Blood drawn. This was sent to the lab for further testing, results above.    (1313) I rechecked the patient and discussed the results of his workup thus far.     (1350)  I consulted with Dr. Valle of the hospitalist services. They are in agreement to accept the patient for admission.    Findings and plan explained to the Patient who consents to admission. Discussed the patient with  "Dr. Valle, who will admit the patient to a medicine bed for further monitoring, evaluation, and treatment.    Impression & Plan    CMS Diagnoses:  The patient has signs of Severe Sepsis as evidenced by:    1. 2 SIRS criteria, AND  2. Suspected infection, AND   3. Organ dysfunction: Lactic Acid > 1.9    Time severe sepsis diagnosis confirmed = 1214 as this was the time when Lactate resulted, and the level was > 1.9      3 Hour Severe Sepsis Bundle Completion:  1. Initial Lactic Acid Result:   Recent Labs   Lab Test  18   1322  18   1211   LACT  3.5*  3.1*     2. Blood Cultures before Antibiotics: Yes  3. Broad Spectrum Antibiotics Administered: Yes     Ceftriaxone at [facility], 1gm, and 1gm here in ED.  PO azithro as well    4. 2000 ml of IV fluids.  Patient height not recorded    Severe Sepsis reassessment:  1. Repeat Lactic Acid Level: 3.5  2. MAP>65 after initial IVF bolus, will continue to monitor fluid status and vital signs              Medical Decision Makin-year-old gentleman with 2 weeks of cough now, fever, and tenderness of the right maxillary sinus from urgent care, where his workup included a chest x-ray showing possible left lower lobe changes consistent with pneumonia versus atelectasis, as well as a sinus x-ray showing maxillary sinusitis.  White count was 22,000 and his blood sugar was over 300.  He was given 1 g of intramuscular Rocephin and sent over here for evaluation.  He has also had a couple episodes of chest pain, which he describes more as a tightness, along with his cough, and had an EKG at the outside hospital for which that provider was concerned with new T wave inversion.  Here his only complaint is his chest feels a little tight like \"asthma\".  His EKG shows a right bundle branch block and I do not appreciate any acute ischemia.  We will do a trial of a neb, along with blood gas/lactate with the ISTAT.  We will give him another gram of Rocephin IV for 2 g total, which " would be an appropriate weight-based dose for his presumed sinusitis/pneumonia as well as azithromycin to cover community acquired pathogens.  I doubt concomitant ACS PE etc.    The patient received some crystalloid fluids here in the emergency department, 1.5 L, and a repeat lactic acid was 3.5. His hemodynamics remained stable without hypotension, and no tachycardia or respiratory distress.  His subjective respiratory symptoms got better with bronchodilators.  Given the persistent lactate elevation, he meets criteria for severe sepsis and we will admit him here for further evaluation and management.  He received Rocephin and azithromycin here in the emergency department.      Critical Care time:  none    Diagnosis:    ICD-10-CM    1. Community acquired pneumonia of left lower lobe of lung (H) J18.1    2. Severe sepsis (H) A41.9     R65.20    3. Acute non-recurrent maxillary sinusitis J01.00        Disposition:  Admitted to Dr. Valle    Scribe Disclosure:  I, Christine Jacinto, am serving as a scribe on 9/26/2018 at 12:03 PM to personally document services performed by Anibal Ragland MD based on my observations and the provider's statements to me.      Christine Jacinto  9/26/2018   [facility] EMERGENCY DEPARTMENT       Anibal Ragland MD  09/26/18 2293

## 2018-09-26 NOTE — PLAN OF CARE
Problem: Patient Care Overview  Goal: Plan of Care/Patient Progress Review  Outcome: No Change  Arrived from ED at shift change (1520). A & Ox4, temp 99.2, , /64, RR 22, 93% sats on RA.  Fine crackles LLL. Denies pain.  States his head feels congested.  Received 1L IV NS Bolus as ordered for Lactic Acid 3.5.  Up independently in room.  , covered per sliding scale.  A1C 7.5.  Tolerating diet.  Sleeping between cares.  Continue to monitor, continue with plan of care.

## 2018-09-27 LAB
ANION GAP SERPL CALCULATED.3IONS-SCNC: 8 MMOL/L (ref 3–14)
BASOPHILS # BLD AUTO: 0.1 10E9/L (ref 0–0.2)
BASOPHILS NFR BLD AUTO: 0.7 %
BUN SERPL-MCNC: 11 MG/DL (ref 7–30)
CALCIUM SERPL-MCNC: 8.3 MG/DL (ref 8.5–10.1)
CHLORIDE SERPL-SCNC: 107 MMOL/L (ref 94–109)
CO2 SERPL-SCNC: 25 MMOL/L (ref 20–32)
CREAT SERPL-MCNC: 0.93 MG/DL (ref 0.66–1.25)
DIFFERENTIAL METHOD BLD: ABNORMAL
EOSINOPHIL # BLD AUTO: 0.2 10E9/L (ref 0–0.7)
EOSINOPHIL NFR BLD AUTO: 2.2 %
ERYTHROCYTE [DISTWIDTH] IN BLOOD BY AUTOMATED COUNT: 13.4 % (ref 10–15)
GFR SERPL CREATININE-BSD FRML MDRD: 80 ML/MIN/1.7M2
GLUCOSE BLDC GLUCOMTR-MCNC: 153 MG/DL (ref 70–99)
GLUCOSE BLDC GLUCOMTR-MCNC: 154 MG/DL (ref 70–99)
GLUCOSE BLDC GLUCOMTR-MCNC: 160 MG/DL (ref 70–99)
GLUCOSE BLDC GLUCOMTR-MCNC: 171 MG/DL (ref 70–99)
GLUCOSE BLDC GLUCOMTR-MCNC: 276 MG/DL (ref 70–99)
GLUCOSE SERPL-MCNC: 170 MG/DL (ref 70–99)
HCT VFR BLD AUTO: 37.4 % (ref 40–53)
HGB BLD-MCNC: 12.4 G/DL (ref 13.3–17.7)
IMM GRANULOCYTES # BLD: 0.1 10E9/L (ref 0–0.4)
IMM GRANULOCYTES NFR BLD: 0.5 %
LYMPHOCYTES # BLD AUTO: 1.5 10E9/L (ref 0.8–5.3)
LYMPHOCYTES NFR BLD AUTO: 13.7 %
MCH RBC QN AUTO: 28.8 PG (ref 26.5–33)
MCHC RBC AUTO-ENTMCNC: 33.2 G/DL (ref 31.5–36.5)
MCV RBC AUTO: 87 FL (ref 78–100)
MONOCYTES # BLD AUTO: 0.7 10E9/L (ref 0–1.3)
MONOCYTES NFR BLD AUTO: 6.5 %
NEUTROPHILS # BLD AUTO: 8.2 10E9/L (ref 1.6–8.3)
NEUTROPHILS NFR BLD AUTO: 76.4 %
NRBC # BLD AUTO: 0 10*3/UL
NRBC BLD AUTO-RTO: 0 /100
PLATELET # BLD AUTO: 211 10E9/L (ref 150–450)
POTASSIUM SERPL-SCNC: 4.2 MMOL/L (ref 3.4–5.3)
RBC # BLD AUTO: 4.3 10E12/L (ref 4.4–5.9)
SODIUM SERPL-SCNC: 140 MMOL/L (ref 133–144)
WBC # BLD AUTO: 10.7 10E9/L (ref 4–11)

## 2018-09-27 PROCEDURE — 25000128 H RX IP 250 OP 636: Performed by: INTERNAL MEDICINE

## 2018-09-27 PROCEDURE — 00000146 ZZHCL STATISTIC GLUCOSE BY METER IP

## 2018-09-27 PROCEDURE — 12000000 ZZH R&B MED SURG/OB

## 2018-09-27 PROCEDURE — 99232 SBSQ HOSP IP/OBS MODERATE 35: CPT | Performed by: INTERNAL MEDICINE

## 2018-09-27 PROCEDURE — 36415 COLL VENOUS BLD VENIPUNCTURE: CPT | Performed by: INTERNAL MEDICINE

## 2018-09-27 PROCEDURE — A9270 NON-COVERED ITEM OR SERVICE: HCPCS | Mod: GY | Performed by: INTERNAL MEDICINE

## 2018-09-27 PROCEDURE — 80048 BASIC METABOLIC PNL TOTAL CA: CPT | Performed by: INTERNAL MEDICINE

## 2018-09-27 PROCEDURE — 85025 COMPLETE CBC W/AUTO DIFF WBC: CPT | Performed by: INTERNAL MEDICINE

## 2018-09-27 PROCEDURE — 25000132 ZZH RX MED GY IP 250 OP 250 PS 637: Mod: GY | Performed by: INTERNAL MEDICINE

## 2018-09-27 RX ORDER — DOXYCYCLINE HYCLATE 50 MG/1
100 CAPSULE ORAL EVERY 12 HOURS SCHEDULED
Status: DISCONTINUED | OUTPATIENT
Start: 2018-09-28 | End: 2018-09-28 | Stop reason: HOSPADM

## 2018-09-27 RX ADMIN — OMEPRAZOLE 20 MG: 20 CAPSULE, DELAYED RELEASE ORAL at 21:41

## 2018-09-27 RX ADMIN — GLIPIZIDE 2.5 MG: 2.5 TABLET, FILM COATED, EXTENDED RELEASE ORAL at 08:26

## 2018-09-27 RX ADMIN — ENOXAPARIN SODIUM 40 MG: 40 INJECTION SUBCUTANEOUS at 16:57

## 2018-09-27 RX ADMIN — ASPIRIN 81 MG: 81 TABLET, COATED ORAL at 08:26

## 2018-09-27 RX ADMIN — INSULIN ASPART 1 UNITS: 100 INJECTION, SOLUTION INTRAVENOUS; SUBCUTANEOUS at 18:13

## 2018-09-27 RX ADMIN — LEVOTHYROXINE SODIUM 150 MCG: 150 TABLET ORAL at 08:26

## 2018-09-27 RX ADMIN — AZITHROMYCIN MONOHYDRATE 250 MG: 250 TABLET ORAL at 08:26

## 2018-09-27 RX ADMIN — LISINOPRIL 10 MG: 10 TABLET ORAL at 21:40

## 2018-09-27 RX ADMIN — INSULIN ASPART 1 UNITS: 100 INJECTION, SOLUTION INTRAVENOUS; SUBCUTANEOUS at 11:30

## 2018-09-27 RX ADMIN — OXYMETAZOLINE HYDROCHLORIDE 2 SPRAY: 0.05 SPRAY NASAL at 21:41

## 2018-09-27 RX ADMIN — CEFTRIAXONE SODIUM 1 G: 1 INJECTION, POWDER, FOR SOLUTION INTRAMUSCULAR; INTRAVENOUS at 12:25

## 2018-09-27 RX ADMIN — INSULIN ASPART 1 UNITS: 100 INJECTION, SOLUTION INTRAVENOUS; SUBCUTANEOUS at 08:23

## 2018-09-27 RX ADMIN — OXYMETAZOLINE HYDROCHLORIDE 2 SPRAY: 0.05 SPRAY NASAL at 08:26

## 2018-09-27 ASSESSMENT — ACTIVITIES OF DAILY LIVING (ADL)
ADLS_ACUITY_SCORE: 9

## 2018-09-27 NOTE — PROVIDER NOTIFICATION
"MD paged \"want recheck lactic?\"    Addendum: MD ordered lactic recheck. Will continue to monitor.   "

## 2018-09-27 NOTE — PLAN OF CARE
Problem: Patient Care Overview  Goal: Plan of Care/Patient Progress Review  Outcome: Improving  Pt continues to be hospitalized for PNA. VSS. Denies pain. Pt reports feeling much better. Infrequent cough.  Tx; antibiotics. Blood cultures pending.  and 160, good appetite. Up independently. Had shower this afternoon. Discharge likely tomorrow.

## 2018-09-27 NOTE — PROGRESS NOTES
[facility]  Hospitalist Progress Note    Luis A Coats MD 09/27/2018  Text Page      Reason for Stay (Diagnosis): Pneumonia, sinusitis         Assessment and Plan:      Summary of Stay: Luis Vidal is a 72 year old male admitted on 9/26/2018.  He has a past medical history of hypertension, dyslipidemia, type 2 diabetes on orals, GERD with Puente's esophagus who presented from urgent care with pneumonia and sinusitis.     Bacterial pneumonia with associated sepsis (based on leukocytosis and lactic acidosis.)  -No hypoxia  -Leukocytosis initially of 22 and elevated lactate.  These have now both normalized  -Symptoms much improved and he is nearing his baseline  -Blood culture negative thus far  -Continue ceftriaxone and azithromycin--Augmentin at discharge  - QTC is mildly prolonged, will need to have his azithromycin replaced with doxycycline     Maxillary sinusitis  -No history of chronic sinus issues  -Still having some tenderness in the right maxillary area but this is improving  -This should be covered with antibiotics for above     Essential hypertension  -Continue home lisinopril     Type 2 diabetes  -Holding metformin given elevated lactate  -Continue oral sulfonylurea  -Correctional insulin, low  -Carb controlled diet    DVT Prophylaxis: Enoxaparin (Lovenox) SQ  Code Status: Full Code  Disposition Plan   Expected discharge tomorow to prior living arrangement if still improving.     Entered: Luis A Coats 09/27/2018, 12:55 PM           Interval History (Subjective):      Feels much better today.  Cough, breathing and energy are much improved.  His facial tenderness in the right maxillary area is improved but not resolved.                  Physical Exam:      Last Vital Signs:  /66 (BP Location: Right arm)  Pulse 79  Temp 97.7  F (36.5  C) (Oral)  Resp 16  Wt 80.7 kg (178 lb)  SpO2 93%      Vital signs reviewed  General:  Alert, calm, NAD  CV: regular rate and  rhythm, no murmurs or rubs  Lungs:  Clear to ascultation bilaterally, normal respiratory effort  HEENT: Mild tenderness in the right maxillary area  Abdomen:  Soft, nontender, nondistended, no masses, normal bowel sounds  Extremities:  No edema  Neuro: normal strength and sensation in all 4 extremities, cranial nerves grossly intact  Psychiatric:  Mood and affect within normal limits           Medications:      All current medications were reviewed with changes reflected in problem list.         Data:      All new lab and imaging data was reviewed.   Labs:    WBC 10.7 from 22  Lactate 1.7 from 3.5

## 2018-09-27 NOTE — PLAN OF CARE
Problem: Pneumonia (Adult)  Goal: Signs and Symptoms of Listed Potential Problems Will be Absent, Minimized or Managed (Pneumonia)  Signs and symptoms of listed potential problems will be absent, minimized or managed by discharge/transition of care (reference Pneumonia (Adult) CPG).   Outcome: Improving  Assumed care from 1900 to 0730  Admitted from ED for LLL PNA & sinusitis  A&Ox4, up ad humberto  LDA: PIV SL, IV rocephin  Lactic recheck 1.7  Vitals: stable, RA  Pain: denies   & 171, High carb diet  GI/: Voiding, not saving  Plan: Antibiotics  Will continue to monitor

## 2018-09-28 VITALS
WEIGHT: 178 LBS | DIASTOLIC BLOOD PRESSURE: 82 MMHG | TEMPERATURE: 97.2 F | SYSTOLIC BLOOD PRESSURE: 151 MMHG | HEART RATE: 79 BPM | OXYGEN SATURATION: 92 % | RESPIRATION RATE: 16 BRPM

## 2018-09-28 LAB
GLUCOSE BLDC GLUCOMTR-MCNC: 198 MG/DL (ref 70–99)
GLUCOSE BLDC GLUCOMTR-MCNC: 201 MG/DL (ref 70–99)
GLUCOSE BLDC GLUCOMTR-MCNC: 218 MG/DL (ref 70–99)
INTERPRETATION ECG - MUSE: NORMAL

## 2018-09-28 PROCEDURE — 99239 HOSP IP/OBS DSCHRG MGMT >30: CPT | Performed by: INTERNAL MEDICINE

## 2018-09-28 PROCEDURE — A9270 NON-COVERED ITEM OR SERVICE: HCPCS | Mod: GY | Performed by: INTERNAL MEDICINE

## 2018-09-28 PROCEDURE — 25000128 H RX IP 250 OP 636: Performed by: INTERNAL MEDICINE

## 2018-09-28 PROCEDURE — 25000132 ZZH RX MED GY IP 250 OP 250 PS 637: Mod: GY | Performed by: INTERNAL MEDICINE

## 2018-09-28 PROCEDURE — 00000146 ZZHCL STATISTIC GLUCOSE BY METER IP

## 2018-09-28 RX ORDER — GLIPIZIDE 5 MG/1
5 TABLET, FILM COATED, EXTENDED RELEASE ORAL DAILY
Qty: 30 TABLET | Refills: 0 | Status: SHIPPED | OUTPATIENT
Start: 2018-09-28

## 2018-09-28 RX ADMIN — DOXYCYCLINE HYCLATE 100 MG: 50 CAPSULE ORAL at 08:03

## 2018-09-28 RX ADMIN — GLIPIZIDE 2.5 MG: 2.5 TABLET, FILM COATED, EXTENDED RELEASE ORAL at 08:03

## 2018-09-28 RX ADMIN — CEFTRIAXONE SODIUM 1 G: 1 INJECTION, POWDER, FOR SOLUTION INTRAMUSCULAR; INTRAVENOUS at 12:05

## 2018-09-28 RX ADMIN — INSULIN ASPART 1 UNITS: 100 INJECTION, SOLUTION INTRAVENOUS; SUBCUTANEOUS at 11:49

## 2018-09-28 RX ADMIN — ASPIRIN 81 MG: 81 TABLET, COATED ORAL at 08:03

## 2018-09-28 RX ADMIN — OXYMETAZOLINE HYDROCHLORIDE 2 SPRAY: 0.05 SPRAY NASAL at 08:04

## 2018-09-28 RX ADMIN — INSULIN ASPART 1 UNITS: 100 INJECTION, SOLUTION INTRAVENOUS; SUBCUTANEOUS at 07:59

## 2018-09-28 RX ADMIN — LEVOTHYROXINE SODIUM 150 MCG: 150 TABLET ORAL at 08:03

## 2018-09-28 ASSESSMENT — ACTIVITIES OF DAILY LIVING (ADL)
ADLS_ACUITY_SCORE: 9

## 2018-09-28 NOTE — PLAN OF CARE
Problem: Patient Care Overview  Goal: Plan of Care/Patient Progress Review  Lung sounds clear. Maintains sats on room air. Denies pain. Infrequent nonproductive cough. Afebrile.

## 2018-09-28 NOTE — PLAN OF CARE
Problem: Patient Care Overview  Goal: Plan of Care/Patient Progress Review  Outcome: Adequate for Discharge Date Met: 09/28/18  Pt vital signs stable. No pain, afebrile. No respiratory complaints. Continues on rocephin and doxycycline, is planning to discharge today.     Problem: Pneumonia (Adult)  Goal: Signs and Symptoms of Listed Potential Problems Will be Absent, Minimized or Managed (Pneumonia)  Signs and symptoms of listed potential problems will be absent, minimized or managed by discharge/transition of care (reference Pneumonia (Adult) CPG).   Outcome: Improving  Pt denies shortness of breath, does not have productive cough, remains afebrile. No shortness of breath.

## 2018-09-28 NOTE — PLAN OF CARE
Problem: Patient Care Overview  Goal: Individualization & Mutuality  Outcome: Adequate for Discharge Date Met: 09/28/18  Pt to D/C to home.  Pt provided with d/c instructions, including new medications, when medications were last given, and when to take them again.  Pt also informed to f/u with PCP in 5 days and repeat chest x-ray in 6 weeks.  Pt verbalized understanding of all d/c and f/u instructions.  All questions were answered at this time.  Copy of paperwork sent with pt.  Wife to provide transport.  All personal belongings sent with pt.

## 2018-09-29 NOTE — DISCHARGE SUMMARY
[facility]    Discharge Summary  Hospitalist    Date of Admission:  9/26/2018  Date of Discharge:  9/28/2018  1:12 PM  Discharging Provider: Luis A Coats MD    Discharge Diagnoses   Community-acquired pneumonia with sepsis  Acute sinusitis of the right maxillary sinus  Type 2 diabetes      Hospital Course   Luis Vidal was admitted on 9/26/2018.  The following problems were addressed during his hospitalization:Summary of Stay: Luis Vidal is a 72 year old male admitted on 9/26/2018.  He has a past medical history of hypertension, dyslipidemia, type 2 diabetes on orals, GERD with Puente's esophagus who presented from urgent care with pneumonia and sinusitis.      Bacterial pneumonia with associated sepsis (based on leukocytosis and lactic acidosis.)  -No hypoxia  -Leukocytosis initially of 22,000 and elevated lactate.  These both normalized  -Symptoms quickly and dramatically improved with antibiotics.  -Blood culture negative thus far  -He was treated with ceftriaxone and azithromycin and will complete a course of antibiotics with Augmentin at discharge  - QTC is mildly prolonged, has bifascicular block      Maxillary sinusitis  -No history of chronic sinus issues  -He presented with significant tenderness in the right maxillary area and x-rays reportedly showed sinusitis.  He is also having nasal congestion and drainage.  These all improved significantly during his hospitalization  -This should be covered with antibiotics for above      Essential hypertension  -Continue home lisinopril      Type 2 diabetes  -Holding metformin given elevated lactate.  We could discuss this could potentially be resumed by his primary care provider in the future with the plan to recheck lactic acid a couple of weeks after resuming metformin if indeed the decide to resume.  -Continue glipizide.  We increase the dose from 2.5-5 mg a day to compensate for discontinuation of metformin  -Carb  controlled diet    Luis A Coats MD    Significant Results and Procedures   Chest x-ray and sinus x-ray was performed at urgent care    Pending Results   Unresulted Labs Ordered in the Past 30 Days of this Admission     Date and Time Order Name Status Description    9/26/2018 1208 Blood culture ONE site Preliminary           Code Status   Full Code       Primary Care Physician   Shailesh Lipscomb    Physical Exam   Temp: 97.2  F (36.2  C) Temp src: Oral BP: 151/82   Heart Rate: 71 Resp: 16 SpO2: 92 % O2 Device: None (Room air)    Vitals:    09/26/18 1157   Weight: 80.7 kg (178 lb)     Vital Signs with Ranges  Temp:  [97.2  F (36.2  C)] 97.2  F (36.2  C)  Heart Rate:  [71] 71  Resp:  [16] 16  BP: (151)/(82) 151/82  SpO2:  [92 %] 92 %  I/O last 3 completed shifts:  In: 3260 [P.O.:2260; IV Piggyback:1000]  Out: -     Discharge Disposition   Discharged to home  Condition at discharge: Stable    Consultations This Hospital Stay   None    Time Spent on this Encounter   I discussed discharge with Luis Vidal.  I, Luis A Coats, personally saw the patient today and spent greater than 30 minutes discharging this patient.    Discharge Orders     Reason for your hospital stay   Community acquired pneumonia, sinusitis.     Follow-up and recommended labs and tests    Dr Turcios in 5 days.  CXR in 6 weeks.     Monitor and record   Blood glucose as previous.     Full Code       Discharge Medications   Discharge Medication List as of 9/28/2018 12:07 PM      START taking these medications    Details   amoxicillin-clavulanate (AUGMENTIN) 875-125 MG per tablet Take 1 tablet by mouth 2 times daily for 8 days, Disp-16 tablet, R-0, E-Prescribe         CONTINUE these medications which have CHANGED    Details   glipiZIDE (GLUCOTROL XL) 5 MG 24 hr tablet Take 1 tablet (5 mg) by mouth daily, Disp-30 tablet, R-0, E-Prescribe         CONTINUE these medications which have NOT CHANGED    Details   aspirin 325 MG EC tablet  Take 650 mg by mouth daily as needed for moderate pain, Historical      levothyroxine (SYNTHROID/LEVOTHROID) 150 MCG tablet Take 150 mcg by mouth daily, Historical      lisinopril (PRINIVIL/ZESTRIL) 10 MG tablet Take 10 mg by mouth At Bedtime, Historical      omeprazole 20 MG tablet Take 20 mg by mouth At Bedtime, Historical         STOP taking these medications       metFORMIN (GLUCOPHAGE) 500 MG tablet Comments:   Reason for Stopping:         metFORMIN (GLUCOPHAGE) 500 MG tablet Comments:   Reason for Stopping:             Allergies   No Known Allergies  Data   Most Recent 3 CBC's:  Recent Labs   Lab Test  09/27/18   0751  09/26/18   1613   WBC  10.7   --    HGB  12.4*   --    MCV  87   --    PLT  211  206      Most Recent 3 BMP's:  Recent Labs   Lab Test  09/27/18   0751  09/26/18   1613   NA  140   --    POTASSIUM  4.2  4.2   CHLORIDE  107   --    CO2  25   --    BUN  11   --    CR  0.93  1.10   ANIONGAP  8   --    BRETT  8.3*   --    GLC  170*   --      Most Recent 2 LFT's:No lab results found.  Most Recent INR's and Anticoagulation Dosing History:  Anticoagulation Dose History     There is no flowsheet data to display.        Most Recent 3 Troponin's:  Recent Labs   Lab Test  09/26/18   1216   TROPI  <0.015     Most Recent Cholesterol Panel:No lab results found.  Most Recent 6 Bacteria Isolates From Any Culture (See EPIC Reports for Culture Details):  Recent Labs   Lab Test  09/26/18   1210   CULT  No growth after 3 days     Most Recent TSH, T4 and A1c Labs:  Recent Labs   Lab Test  09/26/18   1613   A1C  7.5*

## 2018-10-02 LAB
BACTERIA SPEC CULT: NO GROWTH
Lab: NORMAL
SPECIMEN SOURCE: NORMAL

## 2019-10-03 ENCOUNTER — HEALTH MAINTENANCE LETTER (OUTPATIENT)
Age: 74
End: 2019-10-03

## 2019-12-16 ENCOUNTER — HEALTH MAINTENANCE LETTER (OUTPATIENT)
Age: 74
End: 2019-12-16

## 2020-03-22 ENCOUNTER — HEALTH MAINTENANCE LETTER (OUTPATIENT)
Age: 75
End: 2020-03-22

## 2021-01-15 ENCOUNTER — HEALTH MAINTENANCE LETTER (OUTPATIENT)
Age: 76
End: 2021-01-15

## 2021-04-29 ENCOUNTER — APPOINTMENT (OUTPATIENT)
Dept: GENERAL RADIOLOGY | Facility: CLINIC | Age: 76
DRG: 862 | End: 2021-04-29
Attending: EMERGENCY MEDICINE
Payer: COMMERCIAL

## 2021-04-29 ENCOUNTER — HOSPITAL ENCOUNTER (INPATIENT)
Facility: CLINIC | Age: 76
LOS: 3 days | Discharge: HOME | DRG: 862 | End: 2021-05-02
Attending: EMERGENCY MEDICINE | Admitting: INTERNAL MEDICINE
Payer: COMMERCIAL

## 2021-04-29 ENCOUNTER — APPOINTMENT (OUTPATIENT)
Dept: CT IMAGING | Facility: CLINIC | Age: 76
DRG: 862 | End: 2021-04-29
Attending: INTERNAL MEDICINE
Payer: COMMERCIAL

## 2021-04-29 DIAGNOSIS — R50.9 FEVER IN ADULT: ICD-10-CM

## 2021-04-29 DIAGNOSIS — R68.89 RIGORS: ICD-10-CM

## 2021-04-29 DIAGNOSIS — I10 BENIGN ESSENTIAL HYPERTENSION: Primary | ICD-10-CM

## 2021-04-29 DIAGNOSIS — A41.9 SEVERE SEPSIS (H): ICD-10-CM

## 2021-04-29 DIAGNOSIS — R65.20 SEVERE SEPSIS (H): ICD-10-CM

## 2021-04-29 LAB
ALBUMIN SERPL-MCNC: 3.2 G/DL (ref 3.4–5)
ALBUMIN UR-MCNC: 20 MG/DL
ALP SERPL-CCNC: 45 U/L (ref 40–150)
ALT SERPL W P-5'-P-CCNC: 23 U/L (ref 0–70)
ANION GAP SERPL CALCULATED.3IONS-SCNC: 7 MMOL/L (ref 3–14)
APPEARANCE UR: CLEAR
AST SERPL W P-5'-P-CCNC: 18 U/L (ref 0–45)
BASOPHILS # BLD AUTO: 0.1 10E9/L (ref 0–0.2)
BASOPHILS NFR BLD AUTO: 1.1 %
BILIRUB DIRECT SERPL-MCNC: 0.1 MG/DL (ref 0–0.2)
BILIRUB SERPL-MCNC: 0.6 MG/DL (ref 0.2–1.3)
BILIRUB UR QL STRIP: NEGATIVE
BUN SERPL-MCNC: 16 MG/DL (ref 7–30)
CALCIUM SERPL-MCNC: 9 MG/DL (ref 8.5–10.1)
CHLORIDE SERPL-SCNC: 97 MMOL/L (ref 94–109)
CO2 SERPL-SCNC: 26 MMOL/L (ref 20–32)
COLOR UR AUTO: ABNORMAL
CREAT SERPL-MCNC: 1.03 MG/DL (ref 0.66–1.25)
DIFFERENTIAL METHOD BLD: ABNORMAL
EOSINOPHIL # BLD AUTO: 0.4 10E9/L (ref 0–0.7)
EOSINOPHIL NFR BLD AUTO: 3.9 %
ERYTHROCYTE [DISTWIDTH] IN BLOOD BY AUTOMATED COUNT: 24.5 % (ref 10–15)
FLUAV RNA RESP QL NAA+PROBE: NEGATIVE
FLUBV RNA RESP QL NAA+PROBE: NEGATIVE
GFR SERPL CREATININE-BSD FRML MDRD: 71 ML/MIN/{1.73_M2}
GLUCOSE SERPL-MCNC: 132 MG/DL (ref 70–99)
GLUCOSE UR STRIP-MCNC: 500 MG/DL
HCT VFR BLD AUTO: 40.7 % (ref 40–53)
HGB BLD-MCNC: 12.2 G/DL (ref 13.3–17.7)
HGB UR QL STRIP: NEGATIVE
IMM GRANULOCYTES # BLD: 0.1 10E9/L (ref 0–0.4)
IMM GRANULOCYTES NFR BLD: 0.7 %
KETONES UR STRIP-MCNC: NEGATIVE MG/DL
LABORATORY COMMENT REPORT: NORMAL
LACTATE BLD-SCNC: 2.9 MMOL/L (ref 0.7–2)
LACTATE BLD-SCNC: 3.4 MMOL/L (ref 0.7–2)
LEUKOCYTE ESTERASE UR QL STRIP: NEGATIVE
LYMPHOCYTES # BLD AUTO: 1.1 10E9/L (ref 0.8–5.3)
LYMPHOCYTES NFR BLD AUTO: 10.2 %
MCH RBC QN AUTO: 23.1 PG (ref 26.5–33)
MCHC RBC AUTO-ENTMCNC: 30 G/DL (ref 31.5–36.5)
MCV RBC AUTO: 77 FL (ref 78–100)
MONOCYTES # BLD AUTO: 0.8 10E9/L (ref 0–1.3)
MONOCYTES NFR BLD AUTO: 7.2 %
MUCOUS THREADS #/AREA URNS LPF: PRESENT /LPF
NEUTROPHILS # BLD AUTO: 8.1 10E9/L (ref 1.6–8.3)
NEUTROPHILS NFR BLD AUTO: 76.9 %
NITRATE UR QL: NEGATIVE
NRBC # BLD AUTO: 0 10*3/UL
NRBC BLD AUTO-RTO: 0 /100
PH UR STRIP: 6 PH (ref 5–7)
PLATELET # BLD AUTO: 337 10E9/L (ref 150–450)
POTASSIUM SERPL-SCNC: 4.5 MMOL/L (ref 3.4–5.3)
PROCALCITONIN SERPL-MCNC: <0.05 NG/ML
PROT SERPL-MCNC: 7.2 G/DL (ref 6.8–8.8)
RBC # BLD AUTO: 5.27 10E12/L (ref 4.4–5.9)
RBC #/AREA URNS AUTO: 3 /HPF (ref 0–2)
RSV RNA SPEC QL NAA+PROBE: NORMAL
SARS-COV-2 RNA RESP QL NAA+PROBE: NEGATIVE
SODIUM SERPL-SCNC: 130 MMOL/L (ref 133–144)
SOURCE: ABNORMAL
SP GR UR STRIP: 1.02 (ref 1–1.03)
SPECIMEN SOURCE: NORMAL
SQUAMOUS #/AREA URNS AUTO: <1 /HPF (ref 0–1)
TROPONIN I SERPL-MCNC: <0.015 UG/L (ref 0–0.04)
UROBILINOGEN UR STRIP-MCNC: NORMAL MG/DL (ref 0–2)
WBC # BLD AUTO: 10.5 10E9/L (ref 4–11)
WBC #/AREA URNS AUTO: 1 /HPF (ref 0–5)

## 2021-04-29 PROCEDURE — 99285 EMERGENCY DEPT VISIT HI MDM: CPT | Mod: 25

## 2021-04-29 PROCEDURE — 87040 BLOOD CULTURE FOR BACTERIA: CPT | Performed by: EMERGENCY MEDICINE

## 2021-04-29 PROCEDURE — 250N000011 HC RX IP 250 OP 636: Performed by: EMERGENCY MEDICINE

## 2021-04-29 PROCEDURE — 80048 BASIC METABOLIC PNL TOTAL CA: CPT | Performed by: EMERGENCY MEDICINE

## 2021-04-29 PROCEDURE — 99222 1ST HOSP IP/OBS MODERATE 55: CPT | Mod: AI | Performed by: INTERNAL MEDICINE

## 2021-04-29 PROCEDURE — 258N000003 HC RX IP 258 OP 636: Performed by: EMERGENCY MEDICINE

## 2021-04-29 PROCEDURE — 250N000013 HC RX MED GY IP 250 OP 250 PS 637: Performed by: EMERGENCY MEDICINE

## 2021-04-29 PROCEDURE — 83605 ASSAY OF LACTIC ACID: CPT | Performed by: EMERGENCY MEDICINE

## 2021-04-29 PROCEDURE — 84145 PROCALCITONIN (PCT): CPT | Performed by: EMERGENCY MEDICINE

## 2021-04-29 PROCEDURE — 87636 SARSCOV2 & INF A&B AMP PRB: CPT | Performed by: EMERGENCY MEDICINE

## 2021-04-29 PROCEDURE — 96361 HYDRATE IV INFUSION ADD-ON: CPT

## 2021-04-29 PROCEDURE — 120N000001 HC R&B MED SURG/OB

## 2021-04-29 PROCEDURE — 96365 THER/PROPH/DIAG IV INF INIT: CPT

## 2021-04-29 PROCEDURE — 80076 HEPATIC FUNCTION PANEL: CPT | Performed by: EMERGENCY MEDICINE

## 2021-04-29 PROCEDURE — 71045 X-RAY EXAM CHEST 1 VIEW: CPT

## 2021-04-29 PROCEDURE — 36415 COLL VENOUS BLD VENIPUNCTURE: CPT | Performed by: EMERGENCY MEDICINE

## 2021-04-29 PROCEDURE — C9803 HOPD COVID-19 SPEC COLLECT: HCPCS

## 2021-04-29 PROCEDURE — 70487 CT MAXILLOFACIAL W/DYE: CPT

## 2021-04-29 PROCEDURE — 36415 COLL VENOUS BLD VENIPUNCTURE: CPT

## 2021-04-29 PROCEDURE — 250N000013 HC RX MED GY IP 250 OP 250 PS 637: Performed by: INTERNAL MEDICINE

## 2021-04-29 PROCEDURE — 250N000011 HC RX IP 250 OP 636: Performed by: INTERNAL MEDICINE

## 2021-04-29 PROCEDURE — 85025 COMPLETE CBC W/AUTO DIFF WBC: CPT | Performed by: EMERGENCY MEDICINE

## 2021-04-29 PROCEDURE — 81001 URINALYSIS AUTO W/SCOPE: CPT | Performed by: EMERGENCY MEDICINE

## 2021-04-29 PROCEDURE — 250N000012 HC RX MED GY IP 250 OP 636 PS 637: Performed by: INTERNAL MEDICINE

## 2021-04-29 PROCEDURE — 93005 ELECTROCARDIOGRAM TRACING: CPT

## 2021-04-29 PROCEDURE — 84484 ASSAY OF TROPONIN QUANT: CPT | Performed by: EMERGENCY MEDICINE

## 2021-04-29 PROCEDURE — 87086 URINE CULTURE/COLONY COUNT: CPT | Performed by: EMERGENCY MEDICINE

## 2021-04-29 PROCEDURE — 96375 TX/PRO/DX INJ NEW DRUG ADDON: CPT

## 2021-04-29 RX ORDER — ONDANSETRON 2 MG/ML
4 INJECTION INTRAMUSCULAR; INTRAVENOUS EVERY 6 HOURS PRN
Status: DISCONTINUED | OUTPATIENT
Start: 2021-04-29 | End: 2021-05-02 | Stop reason: HOSPADM

## 2021-04-29 RX ORDER — LIDOCAINE 40 MG/G
CREAM TOPICAL
Status: DISCONTINUED | OUTPATIENT
Start: 2021-04-29 | End: 2021-05-02 | Stop reason: HOSPADM

## 2021-04-29 RX ORDER — FAMOTIDINE 20 MG/1
20 TABLET, FILM COATED ORAL 2 TIMES DAILY
Status: DISCONTINUED | OUTPATIENT
Start: 2021-04-29 | End: 2021-05-02 | Stop reason: HOSPADM

## 2021-04-29 RX ORDER — FOLIC ACID 1 MG/1
1 TABLET ORAL DAILY
COMMUNITY

## 2021-04-29 RX ORDER — LEVOTHYROXINE SODIUM 150 UG/1
150 TABLET ORAL DAILY
Status: DISCONTINUED | OUTPATIENT
Start: 2021-04-30 | End: 2021-05-02 | Stop reason: HOSPADM

## 2021-04-29 RX ORDER — IBUPROFEN 600 MG/1
600 TABLET, FILM COATED ORAL EVERY 6 HOURS PRN
Status: DISCONTINUED | OUTPATIENT
Start: 2021-04-29 | End: 2021-05-02 | Stop reason: HOSPADM

## 2021-04-29 RX ORDER — FERROUS SULFATE 325(65) MG
325 TABLET, DELAYED RELEASE (ENTERIC COATED) ORAL
COMMUNITY

## 2021-04-29 RX ORDER — METFORMIN HCL 500 MG
1000 TABLET, EXTENDED RELEASE 24 HR ORAL 2 TIMES DAILY WITH MEALS
COMMUNITY

## 2021-04-29 RX ORDER — ACETAMINOPHEN 500 MG
1000 TABLET ORAL ONCE
Status: COMPLETED | OUTPATIENT
Start: 2021-04-29 | End: 2021-04-29

## 2021-04-29 RX ORDER — FAMOTIDINE 20 MG/1
20 TABLET, FILM COATED ORAL 2 TIMES DAILY
COMMUNITY

## 2021-04-29 RX ORDER — IOPAMIDOL 755 MG/ML
500 INJECTION, SOLUTION INTRAVASCULAR ONCE
Status: COMPLETED | OUTPATIENT
Start: 2021-04-29 | End: 2021-04-29

## 2021-04-29 RX ORDER — ONDANSETRON 4 MG/1
4 TABLET, ORALLY DISINTEGRATING ORAL EVERY 6 HOURS PRN
Status: DISCONTINUED | OUTPATIENT
Start: 2021-04-29 | End: 2021-05-02 | Stop reason: HOSPADM

## 2021-04-29 RX ORDER — POLYETHYLENE GLYCOL 3350 17 G/17G
17 POWDER, FOR SOLUTION ORAL DAILY PRN
Status: DISCONTINUED | OUTPATIENT
Start: 2021-04-29 | End: 2021-05-02 | Stop reason: HOSPADM

## 2021-04-29 RX ORDER — LISINOPRIL 10 MG/1
10 TABLET ORAL AT BEDTIME
Status: DISCONTINUED | OUTPATIENT
Start: 2021-04-29 | End: 2021-04-30

## 2021-04-29 RX ORDER — PREDNISONE 5 MG/1
10 TABLET ORAL 2 TIMES DAILY
Status: DISCONTINUED | OUTPATIENT
Start: 2021-04-29 | End: 2021-05-02 | Stop reason: HOSPADM

## 2021-04-29 RX ORDER — KETOROLAC TROMETHAMINE 15 MG/ML
15 INJECTION, SOLUTION INTRAMUSCULAR; INTRAVENOUS ONCE
Status: COMPLETED | OUTPATIENT
Start: 2021-04-29 | End: 2021-04-29

## 2021-04-29 RX ORDER — ACETAMINOPHEN 325 MG/1
650 TABLET ORAL EVERY 4 HOURS PRN
Status: DISCONTINUED | OUTPATIENT
Start: 2021-04-29 | End: 2021-05-02 | Stop reason: HOSPADM

## 2021-04-29 RX ORDER — PREDNISONE 10 MG/1
10 TABLET ORAL 2 TIMES DAILY
COMMUNITY

## 2021-04-29 RX ORDER — METHOTREXATE SODIUM 2.5 MG/1
20 TABLET ORAL WEEKLY
COMMUNITY

## 2021-04-29 RX ADMIN — TAZOBACTAM SODIUM AND PIPERACILLIN SODIUM 4.5 G: 500; 4 INJECTION, SOLUTION INTRAVENOUS at 18:15

## 2021-04-29 RX ADMIN — TAZOBACTAM SODIUM AND PIPERACILLIN SODIUM 4.5 G: 500; 4 INJECTION, SOLUTION INTRAVENOUS at 23:44

## 2021-04-29 RX ADMIN — ACETAMINOPHEN 1000 MG: 500 TABLET, FILM COATED ORAL at 16:44

## 2021-04-29 RX ADMIN — SODIUM CHLORIDE 2178 ML: 9 INJECTION, SOLUTION INTRAVENOUS at 16:49

## 2021-04-29 RX ADMIN — IOPAMIDOL 80 ML: 755 INJECTION, SOLUTION INTRAVENOUS at 20:59

## 2021-04-29 RX ADMIN — PREDNISONE 10 MG: 5 TABLET ORAL at 21:11

## 2021-04-29 RX ADMIN — FAMOTIDINE 20 MG: 20 TABLET ORAL at 21:11

## 2021-04-29 RX ADMIN — KETOROLAC TROMETHAMINE 15 MG: 15 INJECTION, SOLUTION INTRAMUSCULAR; INTRAVENOUS at 18:15

## 2021-04-29 ASSESSMENT — ENCOUNTER SYMPTOMS
ABDOMINAL PAIN: 0
HEADACHES: 1
FEVER: 1
SHORTNESS OF BREATH: 0
BACK PAIN: 0

## 2021-04-29 ASSESSMENT — ACTIVITIES OF DAILY LIVING (ADL): ADLS_ACUITY_SCORE: 15

## 2021-04-29 ASSESSMENT — MIFFLIN-ST. JEOR: SCORE: 1428.92

## 2021-04-29 NOTE — ED NOTES
[facility]  ED Nurse Handoff Report    Luis Vidal is a 75 year old male   ED Chief complaint: Fever  . ED Diagnosis:   Final diagnoses:   Severe sepsis (H)   Fever in adult   Rigors     Allergies: No Known Allergies    Code Status: Full Code  Activity level - Baseline/Home:  Independent. Activity Level - Current:   Stand by Assist. Lift room needed: No. Bariatric: No   Needed: No   Isolation: Yes. Infection: Not Applicable  COVID r/o and special precautions.     Vital Signs:   Vitals:    04/29/21 1645 04/29/21 1700 04/29/21 1715 04/29/21 1749   BP: 131/84 132/71 117/67    Pulse: 94 94 109    Resp:       Temp:    101.5  F (38.6  C)   TempSrc:    Oral   SpO2: 90% 98%     Weight:           Cardiac Rhythm:  ,      Pain level:    Patient confused: No. Patient Falls Risk: Yes.   Elimination Status: Has voided   Patient Report - Initial Complaint: fever. Focused Assessment: Luis Vidal is a 75 year old male with a history of diabetes, hypertension and hypothyroidism who presents with a fever. He stated that this fever was found when he went to the dentist today. It was found that he had a fever of 102.7 degrees at 10:30 this morning. He took two tylenol and had the fever decrease. He mentioned a headache this morning but otherwise claimed to feel fine. He denied any vision changes, abdominal issues, cough sore throat, dental/jaw pain, back pain, chest pain, shortness of breath,  or pain with urination. No history with cancer or abdominal pain or heart surgeries. He mentioned that he was admitted last year for pneumonia and a sinus infection.  He has not received his COVID-19 vaccine.      Review of Systems   Constitutional: Positive for fever.   Respiratory: Negative for shortness of breath.    Cardiovascular: Negative for chest pain.   Gastrointestinal: Negative for abdominal pain.   Musculoskeletal: Negative for back pain.   Neurological: Positive for headaches.   All other  systems reviewed and are negative.     15:03 ED Triage Notes Filed Pt states he was at dentist this morning and began to experience chills and shakes. Fever of 102.7 per wife, Tylenol at 10:30am. Pt denies painful urination or pain anywhere. ABC's intact, alert and oriented X3.      C/O HA without visual disturbance. Denies CP or SOB    Tests Performed: labs, imaging. Abnormal Results:   Labs Ordered and Resulted from Time of ED Arrival Up to the Time of Departure from the ED   CBC WITH PLATELETS DIFFERENTIAL - Abnormal; Notable for the following components:       Result Value    Hemoglobin 12.2 (*)     MCV 77 (*)     MCH 23.1 (*)     MCHC 30.0 (*)     RDW 24.5 (*)     All other components within normal limits   LACTIC ACID WHOLE BLOOD - Abnormal; Notable for the following components:    Lactic Acid 3.4 (*)     All other components within normal limits   BASIC METABOLIC PANEL - Abnormal; Notable for the following components:    Sodium 130 (*)     Glucose 132 (*)     All other components within normal limits   HEPATIC PANEL - Abnormal; Notable for the following components:    Albumin 3.2 (*)     All other components within normal limits   ROUTINE UA WITH MICROSCOPIC   INFLUENZA A/B & SARS-COV2 PCR MULTIPLEX   TROPONIN I   PROCALCITONIN   PULSE OXIMETRY NURSING   CARDIAC CONTINUOUS MONITORING   PERIPHERAL IV CATHETER   STRICT INTAKE AND OUTPUT   BLOOD CULTURE   BLOOD CULTURE   URINE CULTURE AEROBIC BACTERIAL     XR Chest Port 1 View   Final Result   IMPRESSION: Negative chest.        .   Treatments provided: frequent VS, fluids, see MAR  Family Comments: spouse at bedside  OBS brochure/video discussed/provided to patient:  No  ED Medications:   Medications   sodium chloride (PF) 0.9% PF flush 3 mL (has no administration in time range)   sodium chloride (PF) 0.9% PF flush 3 mL (has no administration in time range)   piperacillin-tazobactam (ZOSYN) intermittent infusion 4.5 g (has no administration in time range)    ketorolac (TORADOL) injection 15 mg (has no administration in time range)   0.9% sodium chloride BOLUS (0 mLs Intravenous Stopped 4/29/21 1804)   acetaminophen (TYLENOL) tablet 1,000 mg (1,000 mg Oral Given 4/29/21 1644)     Drips infusing:  No  For the majority of the shift, the patient's behavior Green. Interventions performed were n/a.    Sepsis treatment initiated: No     Patient tested for COVID 19 prior to admission: YES    ED Nurse Name/Phone Number: April Butterfield RN,   6:05 PM  RECEIVING UNIT ED HANDOFF REVIEW    Above ED Nurse Handoff Report was reviewed: Yes  Reviewed by: Annette Gramajo RN on April 29, 2021 at 7:26 PM

## 2021-04-29 NOTE — ED PROVIDER NOTES
History     Chief Complaint:  Fever    The history is provided by the patient and a significant other.      Luis Vidal is a 75 year old male with a history of diabetes, hypertension and hypothyroidism who presents with a fever and chills. He stated that this fever was found when he went to the dentist today. It was found that he had a fever of 102.7 degrees at 10:30 this morning. He took two tylenol and had the fever decrease. He mentioned a headache this morning but otherwise claimed to feel fine. He denied any vision changes, abdominal issues, cough sore throat, dental/jaw pain, back pain, chest pain, shortness of breath,  or pain with urination. No history with cancer or abdominal pain or heart surgeries. He mentioned that he was admitted last year for pneumonia and a sinus infection.  He has not received his COVID-19 vaccine.     Review of Systems   Constitutional: Positive for fever.   Respiratory: Negative for shortness of breath.    Cardiovascular: Negative for chest pain.   Gastrointestinal: Negative for abdominal pain.   Musculoskeletal: Negative for back pain.   Neurological: Positive for headaches.   All other systems reviewed and are negative.    Allergies:  Meperidine    Medications:    glipizide  Metformin  levothyroxine   lisinopril   omeprazole   methotrexate  Ferrous sulfate      Past Medical History:    Diabetes tyoe II  Hyperlipidemia  Hypertension  Hypothyroidism  Obesity  Barretts esophagus     Past Surgical History:    Tonsillectomy  No history of abdominal or heart surgeries.     Family History:    Brother (s): diabetes  Sister: cataract  Paternal Grandmother: cataract    Social History:  .   Accompanied by wife.    Physical Exam     Patient Vitals for the past 24 hrs:   BP Temp Temp src Pulse Resp SpO2 Weight   04/29/21 1900 -- 98.4  F (36.9  C) Oral -- -- -- --   04/29/21 1801 122/71 -- -- 105 20 94 % --   04/29/21 1800 122/71 -- -- 111 20 95 % --   04/29/21 1749 -- 101.5   F (38.6  C) Oral -- -- -- --   04/29/21 1715 117/67 -- -- 109 -- -- --   04/29/21 1700 132/71 -- -- 94 -- 98 % --   04/29/21 1645 131/84 -- -- 94 -- 90 % --   04/29/21 1638 -- -- -- -- -- -- 72.6 kg (160 lb)   04/29/21 1504 (!) 117/97 98.2  F (36.8  C) Oral 95 18 96 % --     Physical Exam  General: Alert, appears well-developed and well-nourished. Cooperative.     In mild distress  HEENT:  Head:  Atraumatic  Ears:  External ears are normal  Mouth/Throat:  Oropharynx is without erythema or exudate and mucous membranes are moist.   Eyes:   Conjunctivae normal and EOM are normal. No scleral icterus.  CV:  Normal rate, regular rhythm, normal heart sounds and radial pulses are 2+ and symmetric.  No murmur.  Resp:  Breath sounds are clear bilaterally    Non-labored, no retractions or accessory muscle use  GI:  Abdomen is soft, no distension, no tenderness. No rebound or guarding.  No CVA tenderness bilaterally  MS:  Normal range of motion. No edema.    Normal strength in all 4 extremities.     Back atraumatic.    No midline cervical, thoracic, or lumbar tenderness  Skin:  Warm and dry.  No rash or lesions noted.  Neuro: Alert. Normal strength.  GCS: 15  Psych:  Normal mood and affect.    Emergency Department Course   ECG:  ECG taken at 1731, ECG read at 1733  Sinus Tachycardia with premature artrial complexes  Right bundle branch block  Left Anterior fascicular block  Bifascicular christen  Minimal Voltage criteria for LVH may be normal variant   Abnormal ECG   No significant change as compared to prior, dated 9/26/2018.  Rate 107 bpm. ME interval 190 ms. QRS duration 120 ms. QT/QTc 314/419 ms. P-R-T axes 36 -47 18.     Imaging:  XR Chest Port 1 View  Impression: Negative Chest    Laboratory:  Lactic acid (result time 1826) 2.9 (H)   Lactic acid (result time 1615) 3.4 (H)     UA with microscopic: Urine , Protein albumin 20, RBC 3 (H) Mucus present o/w WNL   Urine culture aerobic bacterial: pending    BMP: Na 130 (L),  glucose 132 (H) o/w WNL (Creatinine 1.03)   CBC: WBC 10.5, HGB 12.2 (L),    Hepatic Panel: Albumin 3.2 (L) o/w WNL   Troponin (Collected 1703): <0.015  Procalcitonin: pending  Blood cultures pending    Symptomatic Influenza A/B & SARS-CoV2 (COVID19) Virus PCR Multiplex: pending     Emergency Department Course:    Reviewed:  1625 I reviewed nursing notes, vitals, past history and care everywhere    Assessments:  1627 I obtained history and examined the patient as noted above.   1810 I rechecked the patient and explained findings.     Consults:   1836 Dr. Kaylee Enciso Hospitalist    Interventions:  1644 Tylenol 1,000mg PO  1649 NS 2,178 mL IV Bolus  1815 Zosyn 4.5g IV  1815 Toradol 15mg IV    Disposition:  The patient was admitted to the hospital under the care of Dr. Kaylee Enciso.    Impression & Plan    CMS Diagnoses:   The patient has signs of Severe Sepsis as evidenced by:    1. 2 SIRS criteria, AND  2. Suspected infection, AND   3. Organ dysfunction: Lactic Acidosis with value >2.0    Time severe sepsis diagnosis confirmed: 1615  04/29/21 as this was the time when Lactate resulted, and the level was > 2.0    3 Hour Severe Sepsis Bundle Completion:    1. Initial Lactic Acid Result:   Recent Labs   Lab Test 04/29/21  1816 04/29/21  1559 09/26/18  1945   LACT 2.9* 3.4* 1.7     2. Blood Cultures before Antibiotics: Yes  3. Broad Spectrum Antibiotics Administered:  yes       Anti-infectives (From admission through now)    Start     Dose/Rate Route Frequency Ordered Stop    04/29/21 1735  piperacillin-tazobactam (ZOSYN) intermittent infusion 4.5 g      4.5 g  over 30 Minutes Intravenous ONCE 04/29/21 1731 04/29/21 1900          4. Fluid volume administered in ED:  Full 30 mL/kg bolus given (see amount below).    BMI Readings from Last 1 Encounters:   No data found for BMI     30 mL/kg fluids based on weight: 2,180 mL  30 mL/kg fluids based on IBW (must be >= 60 inches tall): Patient ideal weight not available.                 Severe Sepsis reassessment:  1. Repeat Lactic Acid Level: 2.9  2. MAP>65 after initial IVF bolus, will continue to monitor fluid status and vital signs      Medical Decision Making:  Patient is a 75-year-old male with a history of vasculitis on chronic steroids and methotrexate who presents with fever for the last 24 hours.  Patient's description of symptoms seem concerning for rigors and possible bacteremia.  Reassuringly he is having no cough or chest congestion.  He is having no localizing abdominal discomfort or urinary complaints.  Unclear to the exact etiology of the patient's infection at this time, but high concern for possible bacteremia.  His initial presentation was concerning for severe sepsis with an initial lactate elevation, tachycardia, and fever.  Reassuringly, white count within normal limits at this time.  Due to immunocompromised state, we did provide broad-spectrum antibiotics with IV Zosyn here in the emergency department.  He also received antipyretics and a 30 cc/kg bolus of normal saline.  Repeat lactate after fluid bolus 2.9; MAPs remain >65 mmHg.  Will admit for further cares for infection of unclear etiology.  Spoke with Dr. Enciso of the hospitalist service who agreed to admission.      Critical Care time:  none    Covid-19  Luis Vidal was evaluated during a global COVID-19 pandemic, which necessitated consideration that the patient might be at risk for infection with the SARS-CoV-2 virus that causes COVID-19.   Applicable protocols for evaluation were followed during the patient's care.   COVID-19 was considered as part of the patient's evaluation. The plan for testing is:  a test was obtained during this visit.    Diagnosis:    ICD-10-CM    1. Severe sepsis (H)  A41.9 Blood culture ONE site    R65.20 Blood culture     UA with Microscopic     Urine Culture Aerobic Bacterial     Symptomatic Influenza A/B & SARS-CoV2 (COVID-19) Virus PCR Multiplex     Basic  metabolic panel     Basic metabolic panel     Hepatic panel     Hepatic panel     Troponin I     Troponin I     Procalcitonin     Procalcitonin     Lactic acid whole blood     CANCELED: Basic metabolic panel (BMP)     CANCELED: Hepatic panel     CANCELED: Troponin I (now)     CANCELED: Procalcitonin   2. Fever in adult  R50.9    3. Rigors  R68.89        Scribe Disclosure:  I, Nupur Patterson and Tanika Cordero, am serving as a scribe at 4:27 PM on 4/29/2021 to document services personally performed by Darren Woodruff MD based on my observations and the provider's statements to me.      Darren Woodruff MD  04/30/21 0012

## 2021-04-29 NOTE — ED TRIAGE NOTES
Pt states he was at dentist this morning and began to experience chills and shakes. Fever of 102.7 per wife, Tylenol at 10:30am. Pt denies painful urination or pain anywhere. ABC's intact, alert and oriented X3.

## 2021-04-30 LAB
ANION GAP SERPL CALCULATED.3IONS-SCNC: 6 MMOL/L (ref 3–14)
BACTERIA SPEC CULT: NO GROWTH
BASOPHILS # BLD AUTO: 0 10E9/L (ref 0–0.2)
BASOPHILS NFR BLD AUTO: 0.6 %
BUN SERPL-MCNC: 14 MG/DL (ref 7–30)
CALCIUM SERPL-MCNC: 8.5 MG/DL (ref 8.5–10.1)
CHLORIDE SERPL-SCNC: 105 MMOL/L (ref 94–109)
CO2 SERPL-SCNC: 25 MMOL/L (ref 20–32)
CREAT SERPL-MCNC: 1.11 MG/DL (ref 0.66–1.25)
DIFFERENTIAL METHOD BLD: ABNORMAL
EOSINOPHIL # BLD AUTO: 0.2 10E9/L (ref 0–0.7)
EOSINOPHIL NFR BLD AUTO: 2.3 %
ERYTHROCYTE [DISTWIDTH] IN BLOOD BY AUTOMATED COUNT: 23.7 % (ref 10–15)
GFR SERPL CREATININE-BSD FRML MDRD: 65 ML/MIN/{1.73_M2}
GLUCOSE BLDC GLUCOMTR-MCNC: 146 MG/DL (ref 70–99)
GLUCOSE BLDC GLUCOMTR-MCNC: 203 MG/DL (ref 70–99)
GLUCOSE BLDC GLUCOMTR-MCNC: 240 MG/DL (ref 70–99)
GLUCOSE SERPL-MCNC: 172 MG/DL (ref 70–99)
HBA1C MFR BLD: 7.7 % (ref 0–5.6)
HCT VFR BLD AUTO: 31.6 % (ref 40–53)
HGB BLD-MCNC: 10 G/DL (ref 13.3–17.7)
IMM GRANULOCYTES # BLD: 0 10E9/L (ref 0–0.4)
IMM GRANULOCYTES NFR BLD: 0.5 %
INTERPRETATION ECG - MUSE: NORMAL
LYMPHOCYTES # BLD AUTO: 0.6 10E9/L (ref 0.8–5.3)
LYMPHOCYTES NFR BLD AUTO: 9 %
Lab: NORMAL
MCH RBC QN AUTO: 23.5 PG (ref 26.5–33)
MCHC RBC AUTO-ENTMCNC: 31.6 G/DL (ref 31.5–36.5)
MCV RBC AUTO: 74 FL (ref 78–100)
MONOCYTES # BLD AUTO: 0.4 10E9/L (ref 0–1.3)
MONOCYTES NFR BLD AUTO: 6.4 %
NEUTROPHILS # BLD AUTO: 5.2 10E9/L (ref 1.6–8.3)
NEUTROPHILS NFR BLD AUTO: 81.2 %
NRBC # BLD AUTO: 0 10*3/UL
NRBC BLD AUTO-RTO: 0 /100
PLATELET # BLD AUTO: 248 10E9/L (ref 150–450)
POTASSIUM SERPL-SCNC: 4.4 MMOL/L (ref 3.4–5.3)
RBC # BLD AUTO: 4.25 10E12/L (ref 4.4–5.9)
SODIUM SERPL-SCNC: 136 MMOL/L (ref 133–144)
SPECIMEN SOURCE: NORMAL
WBC # BLD AUTO: 6.4 10E9/L (ref 4–11)

## 2021-04-30 PROCEDURE — 250N000011 HC RX IP 250 OP 636: Performed by: INTERNAL MEDICINE

## 2021-04-30 PROCEDURE — 36415 COLL VENOUS BLD VENIPUNCTURE: CPT | Performed by: INTERNAL MEDICINE

## 2021-04-30 PROCEDURE — 250N000013 HC RX MED GY IP 250 OP 250 PS 637: Performed by: INTERNAL MEDICINE

## 2021-04-30 PROCEDURE — 99233 SBSQ HOSP IP/OBS HIGH 50: CPT | Performed by: HOSPITALIST

## 2021-04-30 PROCEDURE — 120N000001 HC R&B MED SURG/OB

## 2021-04-30 PROCEDURE — 999N001017 HC STATISTIC GLUCOSE BY METER IP

## 2021-04-30 PROCEDURE — 85025 COMPLETE CBC W/AUTO DIFF WBC: CPT | Performed by: INTERNAL MEDICINE

## 2021-04-30 PROCEDURE — 250N000012 HC RX MED GY IP 250 OP 636 PS 637: Performed by: HOSPITALIST

## 2021-04-30 PROCEDURE — 83036 HEMOGLOBIN GLYCOSYLATED A1C: CPT | Performed by: INTERNAL MEDICINE

## 2021-04-30 PROCEDURE — 250N000012 HC RX MED GY IP 250 OP 636 PS 637: Performed by: INTERNAL MEDICINE

## 2021-04-30 PROCEDURE — 80048 BASIC METABOLIC PNL TOTAL CA: CPT | Performed by: INTERNAL MEDICINE

## 2021-04-30 RX ORDER — DEXTROSE MONOHYDRATE 25 G/50ML
25-50 INJECTION, SOLUTION INTRAVENOUS
Status: DISCONTINUED | OUTPATIENT
Start: 2021-04-30 | End: 2021-05-02 | Stop reason: HOSPADM

## 2021-04-30 RX ORDER — NICOTINE POLACRILEX 4 MG
15-30 LOZENGE BUCCAL
Status: DISCONTINUED | OUTPATIENT
Start: 2021-04-30 | End: 2021-05-02 | Stop reason: HOSPADM

## 2021-04-30 RX ADMIN — ACETAMINOPHEN 650 MG: 325 TABLET, FILM COATED ORAL at 00:28

## 2021-04-30 RX ADMIN — PREDNISONE 10 MG: 5 TABLET ORAL at 21:00

## 2021-04-30 RX ADMIN — TAZOBACTAM SODIUM AND PIPERACILLIN SODIUM 4.5 G: 500; 4 INJECTION, SOLUTION INTRAVENOUS at 17:46

## 2021-04-30 RX ADMIN — PREDNISONE 10 MG: 5 TABLET ORAL at 08:37

## 2021-04-30 RX ADMIN — INSULIN ASPART 1 UNITS: 100 INJECTION, SOLUTION INTRAVENOUS; SUBCUTANEOUS at 13:07

## 2021-04-30 RX ADMIN — INSULIN ASPART 3 UNITS: 100 INJECTION, SOLUTION INTRAVENOUS; SUBCUTANEOUS at 17:50

## 2021-04-30 RX ADMIN — TAZOBACTAM SODIUM AND PIPERACILLIN SODIUM 4.5 G: 500; 4 INJECTION, SOLUTION INTRAVENOUS at 05:21

## 2021-04-30 RX ADMIN — LEVOTHYROXINE SODIUM 150 MCG: 150 TABLET ORAL at 08:37

## 2021-04-30 RX ADMIN — FAMOTIDINE 20 MG: 20 TABLET ORAL at 21:00

## 2021-04-30 RX ADMIN — FAMOTIDINE 20 MG: 20 TABLET ORAL at 08:37

## 2021-04-30 RX ADMIN — TAZOBACTAM SODIUM AND PIPERACILLIN SODIUM 4.5 G: 500; 4 INJECTION, SOLUTION INTRAVENOUS at 12:19

## 2021-04-30 RX ADMIN — ACETAMINOPHEN 650 MG: 325 TABLET, FILM COATED ORAL at 11:06

## 2021-04-30 ASSESSMENT — ACTIVITIES OF DAILY LIVING (ADL)
ADLS_ACUITY_SCORE: 12

## 2021-04-30 NOTE — PROGRESS NOTES
Alert, orientated x4. Sitting on the couch with wife. No complaints. No fever now. Food and fluids well. No edema. IV zosyn. Saline locked. independent in room. 1751 already ate supper- noted no Blood sugar check completed. Blood sugar after dinner 240.

## 2021-04-30 NOTE — PHARMACY-ADMISSION MEDICATION HISTORY
Admission medication history interview status for this patient is complete. See [facility] admission navigator for allergy information, prior to admission medications and immunization status.     Medication history interview done, indicate source(s): Patient  Medication history resources (including written lists, pill bottles, clinic record): SureScripts and Care Everywhere  Pharmacy: [facility] in [facility]. [facility] for discharge    Changes made to PTA medication list:  Added: famotidine, ferrous sulfate, folic acid, methotrexate, metformin, prednisone  Deleted: aspirin 650mg PRN and omeprazole  Changed: none    Actions taken by pharmacist (provider contacted, etc): Pending symptomatic COVID test, so called pt to verify home med list.    Additional medication history information:None    Medication reconciliation/reorder completed by provider prior to medication history?  Y   (Y/N)     Prior to Admission medications    Medication Sig Last Dose Taking? Auth Provider   famotidine (PEPCID) 20 MG tablet Take 20 mg by mouth 2 times daily 4/29/2021 at am Yes Unknown, Entered By History   ferrous sulfate (FE TABS) 325 (65 Fe) MG EC tablet Take 325 mg by mouth daily (with lunch) 4/28/2021 at 1200 Yes Unknown, Entered By History   folic acid (FOLVITE) 1 MG tablet Take 1 mg by mouth daily 4/28/2021 at 1200 Yes Unknown, Entered By History   glipiZIDE (GLUCOTROL XL) 5 MG 24 hr tablet Take 1 tablet (5 mg) by mouth daily 4/29/2021 at am Yes Luis A Coats MD   levothyroxine (SYNTHROID/LEVOTHROID) 150 MCG tablet Take 150 mcg by mouth daily 4/29/2021 at am Yes Unknown, Entered By History   lisinopril (PRINIVIL/ZESTRIL) 10 MG tablet Take 10 mg by mouth At Bedtime 4/28/2021 at pm Yes Unknown, Entered By History   metFORMIN (GLUCOPHAGE-XR) 500 MG 24 hr tablet Take 1,000 mg by mouth 2 times daily (with meals) 4/29/2021 at am Yes Unknown, Entered By History   methotrexate 2.5 MG tablet Take 20 mg by mouth once a week 8 tablets on Fridays.  4/23/2021 Yes Unknown, Entered By History   predniSONE (DELTASONE) 10 MG tablet Take 10 mg by mouth 2 times daily 4/29/2021 at am Yes Unknown, Entered By History

## 2021-04-30 NOTE — H&P
[facility]    History and Physical - Hospitalist Service       Date of Admission:  4/29/2021     Assessment & Plan   Luis Vidal is a 75 year old male with a history of type 2 diabetes not on insulin, hypertension, hypothyroidism, and vasculitis most consistent with Wegener's, who presented to the ED complaining of fever.    Fever and lactic acidosis consistent with severe sepsis, infection not yet determined  - Fever started the day of admission at the end of patient's dental work appointment.  Temp was measured as high as 102  F at home.  He had some mild sinus pressure earlier in the morning but that resolved and no other focal symptoms.  Fevers confirmed here up to 101.5  F.  Work-up in the ED essentially unremarkable, white count 10.5, procalcitonin undetectable, LFTs normal. UA unremarkable.  - Lactic acid was elevated to 3.4.  Given IV fluids and the lactic acid came down to 2.9.  - Unclear the etiology for his fever right now.  The association in time with his dental work as well as underlying immune suppression is concerning for bacteremia.  So are the chills and high fevers.  He does not have much in the way of symptoms to follow, no abdominal pain, rash, joint pains etc. except for some mild sinus pressure.  We will check a CT of his facial bones to look for sinus or dental related issues.  - Continue the Zosyn which was started in the ED and follow-up blood cultures.  - If no blood cultures positive and CT unremarkable, consider ID consult versus phone consultation with patient's rheumatologist, as this could be a manifestation of his rheumatologic process.    Vasculitis, consistent with granulomatosis with polyangiitis (Wegener's)  - See care everywhere note from rheumatology in March 2021 for details.  In brief, patient initially presented with ENT related issues with ear pain and throat problems.  Underwent tonsillectomy which did not improve his symptoms.  Ultimately,  groundglass opacities were incidentally found and he underwent pulmonary consultation and found him to have hemosiderin laden macrophages.  He had a mixed picture in terms of his bodies, sometimes the ANCA was negative, sometimes positive.  Ultimately, it was decided that his presentation was most concerning for granulomatosis with polyangiitis.  He was started on methotrexate and prednisone and March.  - Patient currently taking prednisone 20 mg-this will be continued even in light of the fever, as he is chronically on it and at risk for adrenal insufficiency if it is abruptly stopped.  - Methotrexate will be held.    Chronic medical conditions  DM 2-on metformin and glipizide prior to admission.  Hold Metformin because of lactic acidosis.  Hold glipizide to prevent hypoglycemia.  Do not see the need for insulin therapy here.  HTN-resume lisinopril with hold parameters.  Hypothyroidism-resume Synthroid.  GERD-resume Pepcid.    Diet: Regular  DVT Prophylaxis: Low Risk/Ambulatory with no VTE prophylaxis indicated  Handy Catheter: No  Code Status: Full Code    Disposition Plan   Expected discharge:   Anticipate 2 nights in the hospital to monitor fever and blood cultures.    Amilcar Enciso MD  [facility]    ______________________________________________________________________    Chief Complaint   Fever    History of Present Illness   Luis Vidal is a 75 year old male with a history of type 2 diabetes not on insulin, hypertension, hypothyroidism, and vasculitis most consistent with Wegener's, who presented to the ED complaining of fever.    Patient was formally diagnosed with vasculitis in March of this year.  He had been having some sinus and ear issues and was referred to ENT.  He had a CT of his sinuses which incidentally revealed groundglass opacities in his lungs.  He was referred to pulmonology and had a bronchoscopy showing possible diffuse alveolar hemorrhage though it was mild  "as well as hemosiderin laden macrophages.  He was referred to rheumatology.  He had a mixed picture, as he had intermittently positive and negative ANCA's as well as positive and negative MPO antibodies.  Ultimately, it was felt that the syndrome was most consistent with granulomatosis with polyangiitis and he was started on methotrexate and prednisone back in March.  Currently, he is taking 40 mg of prednisone daily.  He also takes methotrexate once per week.  Patient says he has been feeling very well lately and never felt rundown or poor.  He was at the dentist this morning undergoing replacement of a crown as well as a filling.  He said that at the end of the dental appointment and as he arrived home, he started to feel very bad.  He had high fevers and chills.  He measured a temp over 102  F at home.  He was not having any other focal symptoms at that time.  Because of his high fevers and ongoing chills, he presented to the ED.  At this time, patient says he feels much better than he did initially.  He is not having any pain right now.  He denies any focal symptoms right now or lately.  No cough, chest pain, joint pain, diarrhea, abdominal pain, dysuria etc.  He reports that he did have some sinus pressure earlier this morning.    Review of Systems    The 10 point Review of Systems is negative other than noted in the HPI or here.     Physical Exam   BP 95/54   Pulse 78   Temp 96.9  F (36.1  C) (Temporal)   Resp 20   Ht 1.651 m (5' 5\")   Wt 76.2 kg (168 lb)   SpO2 92%   BMI 27.96 kg/m         General: Looks well, no distress.    HEENT: No scleral icterus. Oropharynx moist.  No sinus pain to palpation.  Dentition appears grossly intact with evidence of prior dental work.    Neck: Supple.    Pulmonary: Normal work of breathing. Clear to auscultation bilaterally.    Cardiovascular: Regular rate and rhythm without murmur or extra heart sounds.    Abdomen: Soft and non-tender.    Extremities: No peripheral " edema. No clubbing or cyanosis.  No joint pain or tenderness.    Neurologic: Awake, alert, appropriate.    Skin: Warm and dry.  No rash.    Psychiatric: Normal affect and mood.     Data   Data reviewed today: I have reviewed all labs and imaging results.    Recent Labs   Lab 04/29/21  1703 04/29/21  1559   WBC  --  10.5   HGB  --  12.2*   MCV  --  77*   PLT  --  337   *  --    POTASSIUM 4.5  --    CHLORIDE 97  --    CO2 26  --    BUN 16  --    CR 1.03  --    ANIONGAP 7  --    BRETT 9.0  --    *  --    ALBUMIN 3.2*  --    PROTTOTAL 7.2  --    BILITOTAL 0.6  --    ALKPHOS 45  --    ALT 23  --    AST 18  --    TROPI <0.015  --      Recent Results (from the past 24 hour(s))   XR Chest Port 1 View    Narrative    EXAM: XR CHEST PORT 1 VIEW  LOCATION: [facility]  DATE/TIME: 4/29/2021 4:44 PM    INDICATION: Fever  COMPARISON: None.      Impression    IMPRESSION: Negative chest.       Past Medical History    I have reviewed this patient's medical history and updated it with pertinent information if needed.     Vasculitis  DM 2  HTN    Past Surgical History    I have reviewed this patient's surgical history and updated it with pertinent information if needed.  None.    Social History    I have reviewed this patient's social history and updated it with pertinent information if needed.    Non-smoker, rarely drinks.      Family History    I have reviewed this patient's family history and updated it with pertinent information if needed.     None that is pertinent to his current condition.      Prior to Admission Medications    Prior to Admission Medications   Prescriptions Last Dose Informant Patient Reported? Taking?   famotidine (PEPCID) 20 MG tablet 4/29/2021 at am  Yes Yes   Sig: Take 20 mg by mouth 2 times daily   ferrous sulfate (FE TABS) 325 (65 Fe) MG EC tablet 4/28/2021 at 1200  Yes Yes   Sig: Take 325 mg by mouth daily (with lunch)   folic acid (FOLVITE) 1 MG tablet 4/28/2021 at 1200  Yes Yes    Sig: Take 1 mg by mouth daily   glipiZIDE (GLUCOTROL XL) 5 MG 24 hr tablet 4/29/2021 at am  No Yes   Sig: Take 1 tablet (5 mg) by mouth daily   levothyroxine (SYNTHROID/LEVOTHROID) 150 MCG tablet 4/29/2021 at am  Yes Yes   Sig: Take 150 mcg by mouth daily   lisinopril (PRINIVIL/ZESTRIL) 10 MG tablet 4/28/2021 at pm  Yes Yes   Sig: Take 10 mg by mouth At Bedtime   metFORMIN (GLUCOPHAGE-XR) 500 MG 24 hr tablet 4/29/2021 at am  Yes Yes   Sig: Take 1,000 mg by mouth 2 times daily (with meals)   methotrexate 2.5 MG tablet 4/23/2021  Yes Yes   Sig: Take 20 mg by mouth once a week 8 tablets on Fridays.   predniSONE (DELTASONE) 10 MG tablet 4/29/2021 at am  Yes Yes   Sig: Take 10 mg by mouth 2 times daily      Facility-Administered Medications: None        Allergies    No Known Allergies

## 2021-04-30 NOTE — PLAN OF CARE
Pt A/O, hightest temp 101.4. Pain in ears/throat. Tylenol given. 1L O2 overnight. Up ad humberto. Tolerating diet. Saline locked in between abx.

## 2021-04-30 NOTE — PLAN OF CARE
New adm from ER. VSS afebrile. States fever broke in ER. Has infreq dry cough. C/o sore throat. Being treated for vasculitis prior to admission. Ongoing issues with unknown cause- reports having had a sore spot in throat & involving ear canal. Denies pain currently. Up indep in room. No nausea. Facial CT completed- awaiting results. Covid test neg. IV zosyn started in ER.

## 2021-04-30 NOTE — PLAN OF CARE
Pt A&Ox4. VSS afebrile Ra. This am temp spiked at 1038 to 102.4. temp trending down at at 1218 101.1. discussed with pt and support staff to be using oral temp consistently instead of going between both temporal and oral.  Saline locked. IV Zosyn continued. Up independently in room. Tolerated shower today. LS clear. BG monitoring started this afternoon for meals and at hs.  with sliding scale given. Tolerated regular diet. CT of face done. UA negative. Covid negative. Last lactic acid 2.8. plan to discharge home with wife when Blood cultures back and fever free.  POC reviewed with pt. Will continue to monitor.

## 2021-04-30 NOTE — PROGRESS NOTES
[facility]    Hospitalist Progress Note      Assessment & Plan   Luis Vidal is a 75 year old male with a history of type 2 diabetes not on insulin, hypertension, hypothyroidism, and vasculitis most consistent with Wegener's, who presented to the ED complaining of fever.     #Fever and lactic acidosis consistent with severe sepsis, suspect transient bacteremia in setting of dental work: Fever started the day of admission at the end of patient's dental work appointment.  He then felt chills at home.  Temp was measured as high as 102  F at home.  He had some mild sinus pressure earlier in the morning but that resolved and no other focal symptoms.  Fevers confirmed here up to 101.5  F.  Work-up in the ED showed nl WBC, neg procal, LFT's WNL, UA unremarkable, CXR clear.  He did have a CT facial bones that did show minimal soft tissue stranding the left cheek region and buccal mucosa where dental work was being done.  -Given association with dental work, I do suspect transient bacteremia as etiology of his fever.  CT facial bone also does seem to show some soft tissue stranding in the area.  -This a.m., patient denies any chest pain, shortness of breath, cough, neck stiffness, abdominal pain, nausea vomiting, diarrhea, dysuria.  No skin changes.  - Lactic acid was elevated to 3.4.  Given IV fluids and the lactic acid came down to 2.9.  - Continue Zosyn therapy.  -Follow blood cultures.  Monitor fever curve and any symptoms.      #Vasculitis, consistent with granulomatosis with polyangiitis (Wegener's): See care everywhere note from rheumatology in March 2021 for details.  In brief, patient initially presented with ENT related issues with ear pain and throat problems.  Underwent tonsillectomy which did not improve his symptoms.  Ultimately, groundglass opacities were incidentally found and he underwent pulmonary consultation and found him to have hemosiderin laden macrophages.  He had a  mixed picture in terms of his bodies, sometimes the ANCA was negative, sometimes positive.  Ultimately, it was decided that his presentation was most concerning for granulomatosis with polyangiitis.  He was started on methotrexate and prednisone and March.  - Patient currently taking prednisone 10 mg twice daily.  Continue.  - Holding methotrexate.     Chronic medical conditions  #DM 2: on metformin and glipizide prior to admission.  Holding metformin and glipizide while IP.  sliding scale insulin.    #HTN: Hold lisinopril given soft BP's overnight and concern for sepsis.     #Hypothyroidism: resume Synthroid.  #Chronic microcytic anemia: Monitor. No signs of bleeding.     #GERD: resume Pepcid.     Dispo: Possibly tomorrow if fever free and still feeling baseline.   DVT Prophylaxis: Low Risk/Ambulatory with no VTE prophylaxis indicated  Code Status: Full Code    Darren Mendez MD  Text Page    Interval History   Patient still spiking fevers overnight.  He denies any symptoms as above.  No chest pain, shortness of breath, cough, fevers chills, neck stiffness, dysuria, increased frequency of urination, diarrhea, nausea vomiting, abdominal pain.  This morning he tells me he feels back to normal.    -Data reviewed today: I reviewed all new labs and imaging results over the last 24 hours.     Physical Exam   Temp: 102.4  F (39.1  C) Temp src: Oral BP: 128/57 Pulse: 99   Resp: 20 SpO2: 92 % O2 Device: None (Room air) Oxygen Delivery: 1 LPM  Vitals:    04/29/21 1638 04/29/21 1945   Weight: 72.6 kg (160 lb) 76.2 kg (168 lb)     Vital Signs with Ranges  Temp:  [96.9  F (36.1  C)-102.4  F (39.1  C)] 102.4  F (39.1  C)  Pulse:  [] 99  Resp:  [15-25] 20  BP: ()/(52-97) 128/57  SpO2:  [90 %-98 %] 92 %  I/O last 3 completed shifts:  In: -   Out: 675 [Urine:675]    Constitutional: Nontoxic, NAD  HEENT: Normocephalic. MMM, No significant oral swelling.  No neck stiffness. Full ROM of cervical spine.   Respiratory: Nl WOB,  Clear bilaterally, No wheezes or crackles  Cardiovascular: Regular, no murmur  GI: BS+, NT, ND  Skin/Integumen: WWP, no rash. No edema  Neuro: CNII-XII intact. Moves all extremities. No tremor. A&Ox3.    Medications       famotidine  20 mg Oral BID     levothyroxine  150 mcg Oral Daily     piperacillin-tazobactam  4.5 g Intravenous Q6H     predniSONE  10 mg Oral BID     sodium chloride (PF)  3 mL Intracatheter Q8H     sodium chloride (PF)  3 mL Intracatheter Q8H       Data   Recent Labs   Lab 04/30/21  0633 04/29/21  1703 04/29/21  1559   WBC 6.4  --  10.5   HGB 10.0*  --  12.2*   MCV 74*  --  77*     --  337    130*  --    POTASSIUM 4.4 4.5  --    CHLORIDE 105 97  --    CO2 25 26  --    BUN 14 16  --    CR 1.11 1.03  --    ANIONGAP 6 7  --    BRETT 8.5 9.0  --    * 132*  --    ALBUMIN  --  3.2*  --    PROTTOTAL  --  7.2  --    BILITOTAL  --  0.6  --    ALKPHOS  --  45  --    ALT  --  23  --    AST  --  18  --    TROPI  --  <0.015  --        Recent Results (from the past 24 hour(s))   XR Chest Port 1 View    Narrative    EXAM: XR CHEST PORT 1 VIEW  LOCATION: [facility]  DATE/TIME: 4/29/2021 4:44 PM    INDICATION: Fever  COMPARISON: None.      Impression    IMPRESSION: Negative chest.   CT Facial Bones with Contrast    Narrative    EXAM: CT FACIAL BONES WITH CONTRAST  LOCATION: [facility]  DATE/TIME: 4/29/2021 8:53 PM    INDICATION: Maxillary/facial abscess  COMPARISON: None.  CONTRAST: 80 mL [id number]  TECHNIQUE: Routine CT Maxillofacial with IV contrast. Multiplanar reformats. Dose reduction techniques were used.     FINDINGS:  OSSEOUS STRUCTURES/SOFT TISSUES: There is minimal stranding in the region of the inferior left cheek. There is slight thickening of the buccal mucosa along the left mandible. There is no evidence of a discrete ring-enhancing abscess or fluid collection.   There are periapical lucencies in the regions of previous posterior mandibular molars  bilaterally. These have been resected. The bony cortices of the mandible in these regions are intact.     ORBITAL CONTENTS: No acute abnormality.    SINUSES: No paranasal sinus mucosal disease.    VISUALIZED INTRACRANIAL CONTENTS: No acute abnormality.       Impression    IMPRESSION:   1.  Minimal soft tissue stranding inferior left cheek region and minimal soft tissue swelling of buccal mucosa overlying the body of the left mandible.  2.  No discrete rim-enhancing abscess.  3.  Periapical lucencies seen in the region of the posterior molars of the mandible bilaterally. These molars have been extracted previously. The bony cortices of the mandible are intact bilaterally.

## 2021-05-01 LAB
ANION GAP SERPL CALCULATED.3IONS-SCNC: 5 MMOL/L (ref 3–14)
BUN SERPL-MCNC: 12 MG/DL (ref 7–30)
CALCIUM SERPL-MCNC: 8.7 MG/DL (ref 8.5–10.1)
CHLORIDE SERPL-SCNC: 104 MMOL/L (ref 94–109)
CO2 SERPL-SCNC: 25 MMOL/L (ref 20–32)
CREAT SERPL-MCNC: 0.97 MG/DL (ref 0.66–1.25)
ERYTHROCYTE [DISTWIDTH] IN BLOOD BY AUTOMATED COUNT: 23.6 % (ref 10–15)
GFR SERPL CREATININE-BSD FRML MDRD: 76 ML/MIN/{1.73_M2}
GLUCOSE BLDC GLUCOMTR-MCNC: 184 MG/DL (ref 70–99)
GLUCOSE BLDC GLUCOMTR-MCNC: 191 MG/DL (ref 70–99)
GLUCOSE BLDC GLUCOMTR-MCNC: 191 MG/DL (ref 70–99)
GLUCOSE BLDC GLUCOMTR-MCNC: 217 MG/DL (ref 70–99)
GLUCOSE BLDC GLUCOMTR-MCNC: 232 MG/DL (ref 70–99)
GLUCOSE BLDC GLUCOMTR-MCNC: 330 MG/DL (ref 70–99)
GLUCOSE SERPL-MCNC: 219 MG/DL (ref 70–99)
HCT VFR BLD AUTO: 33 % (ref 40–53)
HGB BLD-MCNC: 9.7 G/DL (ref 13.3–17.7)
MAGNESIUM SERPL-MCNC: 2.1 MG/DL (ref 1.6–2.3)
MCH RBC QN AUTO: 22.5 PG (ref 26.5–33)
MCHC RBC AUTO-ENTMCNC: 29.4 G/DL (ref 31.5–36.5)
MCV RBC AUTO: 77 FL (ref 78–100)
PLATELET # BLD AUTO: 283 10E9/L (ref 150–450)
POTASSIUM SERPL-SCNC: 4.7 MMOL/L (ref 3.4–5.3)
RBC # BLD AUTO: 4.31 10E12/L (ref 4.4–5.9)
SODIUM SERPL-SCNC: 134 MMOL/L (ref 133–144)
WBC # BLD AUTO: 6.2 10E9/L (ref 4–11)

## 2021-05-01 PROCEDURE — 80048 BASIC METABOLIC PNL TOTAL CA: CPT | Performed by: HOSPITALIST

## 2021-05-01 PROCEDURE — 250N000011 HC RX IP 250 OP 636: Performed by: INTERNAL MEDICINE

## 2021-05-01 PROCEDURE — 250N000012 HC RX MED GY IP 250 OP 636 PS 637: Performed by: INTERNAL MEDICINE

## 2021-05-01 PROCEDURE — 250N000013 HC RX MED GY IP 250 OP 250 PS 637: Performed by: INTERNAL MEDICINE

## 2021-05-01 PROCEDURE — 120N000001 HC R&B MED SURG/OB

## 2021-05-01 PROCEDURE — 85027 COMPLETE CBC AUTOMATED: CPT | Performed by: HOSPITALIST

## 2021-05-01 PROCEDURE — 999N001017 HC STATISTIC GLUCOSE BY METER IP

## 2021-05-01 PROCEDURE — 36415 COLL VENOUS BLD VENIPUNCTURE: CPT | Performed by: HOSPITALIST

## 2021-05-01 PROCEDURE — 83735 ASSAY OF MAGNESIUM: CPT | Performed by: HOSPITALIST

## 2021-05-01 PROCEDURE — 99232 SBSQ HOSP IP/OBS MODERATE 35: CPT | Performed by: HOSPITALIST

## 2021-05-01 RX ADMIN — TAZOBACTAM SODIUM AND PIPERACILLIN SODIUM 4.5 G: 500; 4 INJECTION, SOLUTION INTRAVENOUS at 19:00

## 2021-05-01 RX ADMIN — PREDNISONE 10 MG: 5 TABLET ORAL at 08:08

## 2021-05-01 RX ADMIN — FAMOTIDINE 20 MG: 20 TABLET ORAL at 19:41

## 2021-05-01 RX ADMIN — PREDNISONE 10 MG: 5 TABLET ORAL at 19:41

## 2021-05-01 RX ADMIN — LEVOTHYROXINE SODIUM 150 MCG: 150 TABLET ORAL at 08:08

## 2021-05-01 RX ADMIN — INSULIN ASPART 2 UNITS: 100 INJECTION, SOLUTION INTRAVENOUS; SUBCUTANEOUS at 12:29

## 2021-05-01 RX ADMIN — INSULIN ASPART 1 UNITS: 100 INJECTION, SOLUTION INTRAVENOUS; SUBCUTANEOUS at 08:13

## 2021-05-01 RX ADMIN — FAMOTIDINE 20 MG: 20 TABLET ORAL at 08:08

## 2021-05-01 RX ADMIN — TAZOBACTAM SODIUM AND PIPERACILLIN SODIUM 4.5 G: 500; 4 INJECTION, SOLUTION INTRAVENOUS at 13:01

## 2021-05-01 RX ADMIN — TAZOBACTAM SODIUM AND PIPERACILLIN SODIUM 4.5 G: 500; 4 INJECTION, SOLUTION INTRAVENOUS at 07:44

## 2021-05-01 RX ADMIN — INSULIN ASPART 2 UNITS: 100 INJECTION, SOLUTION INTRAVENOUS; SUBCUTANEOUS at 17:39

## 2021-05-01 RX ADMIN — TAZOBACTAM SODIUM AND PIPERACILLIN SODIUM 4.5 G: 500; 4 INJECTION, SOLUTION INTRAVENOUS at 00:12

## 2021-05-01 ASSESSMENT — ACTIVITIES OF DAILY LIVING (ADL)
ADLS_ACUITY_SCORE: 12

## 2021-05-01 NOTE — PLAN OF CARE
Pt A&Ox4. VSS afebrile  Highest temp 99.1. RA. New PIV placed. IV Zosyn maintained. LS clear. +BS/flatus. Up independently in room. BG monitoring  and 217. Tolerated regular diet. Nb/t to BLE baseline. Pt may discharge home later today if he stays afebrile. MD will reassess later this afternoon.     1400   FYI the patient and his wife would like to take your recommendation and stay until tomorrow. highest temp today 99.1 so far. Thank you    POC reviewed with pt. Will continue to monitor

## 2021-05-01 NOTE — PLAN OF CARE
End of Shift Note  See flowsheets for vital signs and complete assessments.    Pertinent Assessments: A&Ox4. Tmax 99.2. Denies pain, nausea, SOB, numbness & tingling. Small abrasion on left shin, scabbed over. Up independent in room.     Major Shift Events: Lost IV access at the end of the shift. Flyer paged after unsuccessful attempts to start another one.    Treatment Plan: IV Zosyn, awaiting blood & urine cultures.    Discharge: Possibly today per MD note.    Bedside RN: Ena Severino

## 2021-05-01 NOTE — PROGRESS NOTES
[facility]    Hospitalist Progress Note      Assessment & Plan   Luis Vidal is a 75 year old male with a history of type 2 diabetes not on insulin, hypertension, hypothyroidism, and vasculitis most consistent with Wegener's, who presented to the ED complaining of fever.     #Fever and lactic acidosis consistent with severe sepsis, suspect transient bacteremia in setting of dental work: Fever started the day of admission at the end of patient's dental work appointment.  He then felt chills at home.  Temp was measured as high as 102  F at home.  He had some mild sinus pressure earlier in the morning but that resolved and no other focal symptoms.  Fevers confirmed here.  Work-up in the ED showed nl WBC, neg procal, LFT's WNL, UA unremarkable, CXR clear.  He did have a CT facial bones that did show minimal soft tissue stranding the left cheek region and buccal mucosa where dental work was being done.  -Given association with dental work, I do suspect transient bacteremia as etiology of his fever.  CT facial bone also does seem to show some soft tissue stranding in the area.  -He did have a significant fever yesterday to 102.4.  Tmax overnight at 99.    -Patient denies any chest pain, shortness of breath, cough, neck stiffness, abdominal pain, nausea vomiting, diarrhea, dysuria.  No skin changes.  He has been up walking and feels OK.    - Lactic acid was elevated to 3.4.  Given IV fluids and the lactic acid came down to 2.9.  - Continue Zosyn therapy.  -Follow blood cultures.   Still showing no growth.  His fever curve is improved over the last 24 hours.  -Monitor cultures and fever curve overnight.  If does well then can discharge tomorrow on a course of Augmentin.     #Vasculitis, consistent with granulomatosis with polyangiitis (Wegener's): See care everywhere note from rheumatology in March 2021 for details.  In brief, patient initially presented with ENT related issues with ear pain  and throat problems.  Underwent tonsillectomy which did not improve his symptoms.  Ultimately, groundglass opacities were incidentally found and he underwent pulmonary consultation and found him to have hemosiderin laden macrophages.  He had a mixed picture in terms of his bodies, sometimes the ANCA was negative, sometimes positive.  Ultimately, it was decided that his presentation was most concerning for granulomatosis with polyangiitis.  He was started on methotrexate and prednisone and March.  - Patient currently taking prednisone 10 mg twice daily.  Continue.  - Holding methotrexate.     Chronic medical conditions  #DM 2: on metformin and glipizide prior to admission.  Holding metformin and glipizide while IP.  sliding scale insulin.    #HTN: Hold lisinopril given soft BP's overnight and concern for sepsis.     #Hypothyroidism: resume Synthroid.  #Chronic microcytic anemia: Monitor. No signs of bleeding.     #GERD: resume Pepcid.     Dispo: Monitor blood cultures and fever curve overnight given fever to 102.4 yesterday late AM.  Patient and wife both feel more comfortable with this as opposed to discharge later this afternoon with outpatient f/u.    DVT Prophylaxis: Low Risk/Ambulatory with no VTE prophylaxis indicated  Code Status: Full Code    Darren Mendez MD  Text Page    Interval History   No acute events.  Fever to 102.4 yesterday.  He feels better.  Denies any subjective fevers or chills overnight.  No chest pain.  No shortness of breath.  No nausea vomiting.  No diarrhea.  No skin changes.  No dysuria.    -Data reviewed today: I reviewed all new labs and imaging results over the last 24 hours.    Physical Exam   Temp: 99.1  F (37.3  C) Temp src: Oral BP: 118/67 Pulse: 76   Resp: 18 SpO2: 93 % O2 Device: None (Room air)    Vitals:    04/29/21 1638 04/29/21 1945   Weight: 72.6 kg (160 lb) 76.2 kg (168 lb)     Vital Signs with Ranges  Temp:  [97.1  F (36.2  C)-99.2  F (37.3  C)] 99.1  F (37.3  C)  Pulse:   [71-76] 76  Resp:  [16-19] 18  BP: (114-122)/(57-71) 118/67  SpO2:  [92 %-96 %] 93 %  I/O last 3 completed shifts:  In: 610 [P.O.:610]  Out: 1800 [Urine:1800]    Constitutional: Nontoxic, NAD  HEENT: Normocephalic. MMM, No significant oral swelling.  No neck stiffness. Full ROM of cervical spine.   Respiratory: Nl WOB, Clear bilaterally, No wheezes or crackles  Cardiovascular: Regular, no murmur  GI: BS+, NT, ND  Skin/Integumen: WWP, no rash. No edema  Neuro: CNII-XII intact. Moves all extremities. No tremor. A&Ox3.    Medications       famotidine  20 mg Oral BID     insulin aspart  1-7 Units Subcutaneous TID AC     insulin aspart  1-5 Units Subcutaneous At Bedtime     levothyroxine  150 mcg Oral Daily     piperacillin-tazobactam  4.5 g Intravenous Q6H     predniSONE  10 mg Oral BID     sodium chloride (PF)  3 mL Intracatheter Q8H       Data   Recent Labs   Lab 05/01/21  0605 04/30/21  0633 04/29/21  1703 04/29/21  1559   WBC 6.2 6.4  --  10.5   HGB 9.7* 10.0*  --  12.2*   MCV 77* 74*  --  77*    248  --  337    136 130*  --    POTASSIUM 4.7 4.4 4.5  --    CHLORIDE 104 105 97  --    CO2 25 25 26  --    BUN 12 14 16  --    CR 0.97 1.11 1.03  --    ANIONGAP 5 6 7  --    BRETT 8.7 8.5 9.0  --    * 172* 132*  --    ALBUMIN  --   --  3.2*  --    PROTTOTAL  --   --  7.2  --    BILITOTAL  --   --  0.6  --    ALKPHOS  --   --  45  --    ALT  --   --  23  --    AST  --   --  18  --    TROPI  --   --  <0.015  --        No results found for this or any previous visit (from the past 24 hour(s)).

## 2021-05-02 VITALS
BODY MASS INDEX: 27.99 KG/M2 | HEART RATE: 72 BPM | SYSTOLIC BLOOD PRESSURE: 124 MMHG | TEMPERATURE: 97.4 F | RESPIRATION RATE: 20 BRPM | OXYGEN SATURATION: 96 % | HEIGHT: 65 IN | WEIGHT: 168 LBS | DIASTOLIC BLOOD PRESSURE: 64 MMHG

## 2021-05-02 LAB
GLUCOSE BLDC GLUCOMTR-MCNC: 210 MG/DL (ref 70–99)
GLUCOSE BLDC GLUCOMTR-MCNC: 280 MG/DL (ref 70–99)

## 2021-05-02 PROCEDURE — 250N000011 HC RX IP 250 OP 636: Performed by: INTERNAL MEDICINE

## 2021-05-02 PROCEDURE — 999N001017 HC STATISTIC GLUCOSE BY METER IP

## 2021-05-02 PROCEDURE — 250N000012 HC RX MED GY IP 250 OP 636 PS 637: Performed by: INTERNAL MEDICINE

## 2021-05-02 PROCEDURE — 250N000013 HC RX MED GY IP 250 OP 250 PS 637: Performed by: INTERNAL MEDICINE

## 2021-05-02 PROCEDURE — 99239 HOSP IP/OBS DSCHRG MGMT >30: CPT | Performed by: HOSPITALIST

## 2021-05-02 RX ORDER — LISINOPRIL 10 MG/1
5 TABLET ORAL AT BEDTIME
Start: 2021-05-02

## 2021-05-02 RX ADMIN — TAZOBACTAM SODIUM AND PIPERACILLIN SODIUM 4.5 G: 500; 4 INJECTION, SOLUTION INTRAVENOUS at 01:27

## 2021-05-02 RX ADMIN — INSULIN ASPART 2 UNITS: 100 INJECTION, SOLUTION INTRAVENOUS; SUBCUTANEOUS at 08:16

## 2021-05-02 RX ADMIN — LEVOTHYROXINE SODIUM 150 MCG: 150 TABLET ORAL at 08:15

## 2021-05-02 RX ADMIN — TAZOBACTAM SODIUM AND PIPERACILLIN SODIUM 4.5 G: 500; 4 INJECTION, SOLUTION INTRAVENOUS at 06:55

## 2021-05-02 RX ADMIN — FAMOTIDINE 20 MG: 20 TABLET ORAL at 08:15

## 2021-05-02 RX ADMIN — PREDNISONE 10 MG: 5 TABLET ORAL at 08:15

## 2021-05-02 ASSESSMENT — ACTIVITIES OF DAILY LIVING (ADL)
ADLS_ACUITY_SCORE: 12

## 2021-05-02 NOTE — DISCHARGE INSTRUCTIONS
Bacteremia, Suspected (Adult)  Bacteremia is a bacterial infection that has spread to the bloodstream. This is serious because it can cause a lot of harm to the body. It can spread to other organs, including the kidneys, brain, and lungs. Bacteremia that spreads and harms other parts of the body is called sepsis.   You will have lab tests and imaging tests. The lab tests will include blood cultures to check for bacteremia. They will help show the type of bacteria that you have. You will likely be given antibiotics before the results of the blood cultures are known.   Causes  Bacteremia often starts with an infection in 1 area (local), but it then spreads to the blood. Almost any type of infection can cause bacteremia. This includes:     Urinary tract infection    Skin infection    Gastrointestinal problem    Infection after surgery    Lung infection (pneumonia)  Symptoms  At first symptoms may seem like any local infection or illness. But then they get worse. Symptoms can include:     Fever and chills    Loss of appetite    Nausea or vomiting    Trouble breathing or fast breathing    Fast heart rate    Feeling lightheaded or faint    Skin rashes or blotches    Confusion, severe sleepiness, or loss of consciousness  Home care  People with bacteremia are most often treated in the hospital. After the most severe part of the illness is better, you may be sent home to complete your treatment.   When caring for yourself at home:     Rest at home for the first 2 to 3 days. When resuming activity, don't let yourself become too tired.    You can take acetaminophen or ibuprofen for pain, unless you were given a different pain medicine to use. Talk with your healthcare provider before using these medicines if you have chronic liver or kidney disease. Talk with your provider if you have had a stomach ulcer or digestive bleeding. Also talk to your provider if you are taking medicine to prevent blood clots.    If you were given  antibiotics, take them until they are used up, or your healthcare provider tells you to stop. It's important to finish the antibiotics even though you feel better. This is to make sure the infection has cleared.    Your appetite may be poor, so a light diet is fine. Drink plenty of fluids (6 to 8 glasses of fluid per day). This includes water, soft drinks, sports drinks, juices, tea, or soup.  Follow-up care  Follow up with your healthcare provider, or as advised.   Once the results of the blood culture are known, your healthcare provider may change your antibiotic. You can call for the results.   If you had X-rays, a CT scan, or an ultrasound, a specialist will look at them. You will be told of any results that may affect your care.   Call 911  Call 911 if you have any of these:     Wheezing or trouble breathing    Trouble swallowing    Chest pain    Confusion or sudden change in behavior    Extreme drowsiness or trouble awakening    Fainting or loss of consciousness    Fast heart rate    Low blood pressure    Vomiting blood, or large amounts of blood in stool    Seizure  When to get medical advice  Call your healthcare provider or get other medical care right away if you have any of these:     Cough with lots of colored mucus, or blood in your mucus    Severe headache    Severe face, neck, throat, or ear pain    Pain in the abdomen    Weakness, dizziness, repeated vomiting, or diarrhea    Joint pain or a new rash    Burning when urinating    Fever of 100.4 F (38 C) or higher, or as directed by your healthcare provider  Leeanne last reviewed this educational content on 4/1/2020 2000-2021 [employer]. All rights reserved. This information is not intended as a substitute for professional medical care. Always follow your healthcare professional's instructions.

## 2021-05-02 NOTE — PLAN OF CARE
Pt A&O x4. VSS afebrile RA. Saline lock discontinued. Cms intact. LS clear. +bs/flatus. Tolerated diet. No nausea. SOB or pain per pt. LBM 5/1/2021 per pt. . Pt to start PO Augmentin at discharge. Discharge medication given to pt and discharge instructions discussed and questions answered with pt and wife. Forms singed and on chart. Pt tool all belongings, cell phone, computer etc with hip. Pt left floor at 1005.

## 2021-05-02 NOTE — PROGRESS NOTES
Temperature 97.9 and 97.3 during shift, IV zosyn in use, up independently in his room.  at 2200, says he had a pizza for dinner. Will keep monitoring.

## 2021-05-02 NOTE — DISCHARGE SUMMARY
"[facility]    Discharge Summary  Hospitalist    Date of Admission:  4/29/2021  Date of Discharge:  5/2/2021  Discharging Provider: Darren Mendez MD  Date of Service (when I saw the patient): 05/02/21    Discharge Diagnoses   #Severe sepsis, suspect transient bacteremia in setting of dental work  #Vasculitis, consistent with granulomatosis with polyangiitis (Wegener's)  #DM 2  #HTN  #Hypothyroidism  #Chronic microcytic anemia   #GERD    History of Present Illness   \"Luis Vidal is a 75 year old male with a history of type 2 diabetes not on insulin, hypertension, hypothyroidism, and vasculitis most consistent with Wegener's, who presented to the ED complaining of fever.     Patient was formally diagnosed with vasculitis in March of this year.  He had been having some sinus and ear issues and was referred to ENT.  He had a CT of his sinuses which incidentally revealed groundglass opacities in his lungs.  He was referred to pulmonology and had a bronchoscopy showing possible diffuse alveolar hemorrhage though it was mild as well as hemosiderin laden macrophages.  He was referred to rheumatology.  He had a mixed picture, as he had intermittently positive and negative ANCA's as well as positive and negative MPO antibodies.  Ultimately, it was felt that the syndrome was most consistent with granulomatosis with polyangiitis and he was started on methotrexate and prednisone back in March.  Currently, he is taking 40 mg of prednisone daily.  He also takes methotrexate once per week.  Patient says he has been feeling very well lately and never felt rundown or poor.  He was at the dentist this morning undergoing replacement of a crown as well as a filling.  He said that at the end of the dental appointment and as he arrived home, he started to feel very bad.  He had high fevers and chills.  He measured a temp over 102  F at home.  He was not having any other focal symptoms at that time.  Because of " "his high fevers and ongoing chills, he presented to the ED.  At this time, patient says he feels much better than he did initially.  He is not having any pain right now.  He denies any focal symptoms right now or lately.  No cough, chest pain, joint pain, diarrhea, abdominal pain, dysuria etc.  He reports that he did have some sinus pressure earlier this morning.\"    Hospital Course   Luis Vidal is a 75 year old male with a history of type 2 diabetes not on insulin, hypertension, hypothyroidism, and vasculitis most consistent with Wegener's, who presented to the ED complaining of fever.     #Fever and lactic acidosis consistent with severe sepsis, suspect transient bacteremia in setting of dental work: Fever started the day of admission at the end of patient's dental work appointment.  He then felt chills at home.  Temp was measured as high as 102  F at home.  He had some mild sinus pressure earlier in the morning but that resolved and no other focal symptoms.  Fevers confirmed here.  Work-up in the ED showed nl WBC, neg procal, LFT's WNL, UA unremarkable, CXR clear.  He did have a CT facial bones that did show minimal soft tissue stranding the left cheek region and buccal mucosa where dental work was being done.  -Given association with dental work, I do suspect transient bacteremia as etiology of his fever.  CT facial bone also does seem to show some soft tissue stranding in the area.  -His last fever was on 04/30 to 102.4.    He was fever free for 36 to 48 hours prior to discharge.  He felt back to his baseline.  Blood cultures were still negative.  Patient denies any chest pain, shortness of breath, cough, neck stiffness, abdominal pain, nausea vomiting, diarrhea, dysuria.  No skin changes.  He has been up walking and feels OK.    - Lactic acid was elevated to 3.4.  Given IV fluids and the lactic acid came down to 2.9.  -Patient was treated with Zosyn therapy.  We will complete a 7-day course of " antibiotics with Augmentin on discharge.  He will follow-up with his PCP in the next week.     #Vasculitis, consistent with granulomatosis with polyangiitis (Wegener's): See care everywhere note from rheumatology in March 2021 for details.  In brief, patient initially presented with ENT related issues with ear pain and throat problems.  Underwent tonsillectomy which did not improve his symptoms.  Ultimately, groundglass opacities were incidentally found and he underwent pulmonary consultation and found him to have hemosiderin laden macrophages.  He had a mixed picture in terms of his bodies, sometimes the ANCA was negative, sometimes positive.  Ultimately, it was decided that his presentation was most concerning for granulomatosis with polyangiitis.  He was started on methotrexate and prednisone and March.  - Patient currently taking prednisone 10 mg twice daily.  Continue.  -Patient will resume his methotrexate on Friday.     Chronic medical conditions  #DM 2: on metformin and glipizide prior to admission.  Will resume on discharge.  #HTN: Patient to resume 5 mg lisinopril on discharge from 10 mg on admission.  I instructed him to check his blood pressures daily.  He will follow-up with his PCP.  #Hypothyroidism: resume Synthroid.  #Chronic microcytic anemia: Monitor. No signs of bleeding.     #GERD: resume Pepcid.    Darren Mendez MD    Pending Results   These results will be followed up by PCP  Unresulted Labs Ordered in the Past 30 Days of this Admission     Date and Time Order Name Status Description    4/29/2021 1634 Blood culture Preliminary     4/29/2021 1550 Blood culture ONE site Preliminary           Code Status   Full Code       Primary Care Physician   Shailesh Lipscomb    Physical Exam   Temp: 97.4  F (36.3  C) Temp src: Oral BP: 124/64 Pulse: 72   Resp: 20 SpO2: 96 % O2 Device: None (Room air)    Vitals:    04/29/21 1638 04/29/21 1945   Weight: 72.6 kg (160 lb) 76.2 kg (168 lb)     Vital Signs with  Ranges  Temp:  [97.3  F (36.3  C)-99.1  F (37.3  C)] 97.4  F (36.3  C)  Pulse:  [68-78] 72  Resp:  [16-20] 20  BP: (118-143)/(52-77) 124/64  SpO2:  [93 %-96 %] 96 %  I/O last 3 completed shifts:  In: 603 [P.O.:600; I.V.:3]  Out: -     Constitutional: Nontoxic, NAD  HEENT: Normocephalic. MMM, No significant oral swelling.  No neck stiffness. Full ROM of cervical spine.   Respiratory: Nl WOB, Clear bilaterally, No wheezes or crackles  Cardiovascular: Regular, no murmur  GI: BS+, NT, ND  Skin/Integumen: WWP, no rash. No edema  Neuro: CNII-XII intact. Moves all extremities. No tremor. A&Ox3.    Discharge Disposition   Discharged to home  Condition at discharge: Stable    Consultations This Hospital Stay   None    Time Spent on this Encounter   I, Darren Mendez MD, personally saw the patient today and spent greater than 30 minutes discharging this patient.    Discharge Orders      Reason for your hospital stay    You were hospitalized for fever with dental procedure.  I suspect you had transient bacteremia in the setting of immunosuppression with dental procedure.  You were placed on IV antibiotics.  Fever curve improved.  You will complete a short course of Augmentin on discharge.  He is to follow-up with your PCP in the next week or so.    I did reduce the dose of your lisinopril at home to 5 mg daily from 10 mg daily.  I do recommend checking your blood pressure at home maintain a log.  Follow-up with PCP.     Follow-up and recommended labs and tests     Follow up with primary care provider, Shailesh Lipscomb, within 7 days for hospital follow- up.  No follow up labs or test are needed.     Activity    Your activity upon discharge: activity as tolerated     Monitor and record    blood pressure daily     Full Code     Diet    Follow this diet upon discharge: Orders Placed This Encounter      Combination Diet Regular Diet Adult     Discharge Medications   Current Discharge Medication List      START taking these  medications    Details   amoxicillin-clavulanate (AUGMENTIN) 875-125 MG tablet Take 1 tablet by mouth 2 times daily for 5 days  Qty: 10 tablet, Refills: 0    Associated Diagnoses: Severe sepsis (H)         CONTINUE these medications which have CHANGED    Details   lisinopril (ZESTRIL) 10 MG tablet Take 0.5 tablets (5 mg) by mouth At Bedtime    Associated Diagnoses: Benign essential hypertension         CONTINUE these medications which have NOT CHANGED    Details   famotidine (PEPCID) 20 MG tablet Take 20 mg by mouth 2 times daily      ferrous sulfate (FE TABS) 325 (65 Fe) MG EC tablet Take 325 mg by mouth daily (with lunch)      folic acid (FOLVITE) 1 MG tablet Take 1 mg by mouth daily      glipiZIDE (GLUCOTROL XL) 5 MG 24 hr tablet Take 1 tablet (5 mg) by mouth daily  Qty: 30 tablet, Refills: 0    Associated Diagnoses: Diabetes mellitus without complication (H)      levothyroxine (SYNTHROID/LEVOTHROID) 150 MCG tablet Take 150 mcg by mouth daily      metFORMIN (GLUCOPHAGE-XR) 500 MG 24 hr tablet Take 1,000 mg by mouth 2 times daily (with meals)      methotrexate 2.5 MG tablet Take 20 mg by mouth once a week 8 tablets on Fridays.      predniSONE (DELTASONE) 10 MG tablet Take 10 mg by mouth 2 times daily           Allergies   No Known Allergies  Data   Most Recent 3 CBC's:  Recent Labs   Lab Test 05/01/21  0605 04/30/21  0633 04/29/21  1559   WBC 6.2 6.4 10.5   HGB 9.7* 10.0* 12.2*   MCV 77* 74* 77*    248 337      Most Recent 3 BMP's:  Recent Labs   Lab Test 05/01/21  0605 04/30/21  0633 04/29/21  1703    136 130*   POTASSIUM 4.7 4.4 4.5   CHLORIDE 104 105 97   CO2 25 25 26   BUN 12 14 16   CR 0.97 1.11 1.03   ANIONGAP 5 6 7   BRETT 8.7 8.5 9.0   * 172* 132*     Most Recent 2 LFT's:  Recent Labs   Lab Test 04/29/21  1703   AST 18   ALT 23   ALKPHOS 45   BILITOTAL 0.6     Most Recent INR's and Anticoagulation Dosing History:  Anticoagulation Dose History     There is no flowsheet data to display.         Most Recent 3 Troponin's:  Recent Labs   Lab Test 04/29/21  1703 09/26/18  1216   TROPI <0.015 <0.015     Most Recent Cholesterol Panel:No lab results found.  Most Recent 6 Bacteria Isolates From Any Culture (See EPIC Reports for Culture Details):  Recent Labs   Lab Test 04/29/21  1748 04/29/21  1702 04/29/21  1558 09/26/18  1210   CULT No growth No growth after 3 days No growth after 3 days No growth     Most Recent TSH, T4 and A1c Labs:  Recent Labs   Lab Test 04/30/21  0633   A1C 7.7*

## 2021-05-05 LAB
BACTERIA SPEC CULT: NO GROWTH
BACTERIA SPEC CULT: NO GROWTH
Lab: NORMAL
Lab: NORMAL
SPECIMEN SOURCE: NORMAL
SPECIMEN SOURCE: NORMAL

## 2021-09-05 ENCOUNTER — HEALTH MAINTENANCE LETTER (OUTPATIENT)
Age: 76
End: 2021-09-05

## 2022-02-20 ENCOUNTER — HEALTH MAINTENANCE LETTER (OUTPATIENT)
Age: 77
End: 2022-02-20

## 2022-10-23 ENCOUNTER — HEALTH MAINTENANCE LETTER (OUTPATIENT)
Age: 77
End: 2022-10-23

## 2023-11-05 ENCOUNTER — HEALTH MAINTENANCE LETTER (OUTPATIENT)
Age: 78
End: 2023-11-05